# Patient Record
Sex: FEMALE | Race: WHITE | Employment: OTHER | URBAN - METROPOLITAN AREA
[De-identification: names, ages, dates, MRNs, and addresses within clinical notes are randomized per-mention and may not be internally consistent; named-entity substitution may affect disease eponyms.]

---

## 2017-03-23 ENCOUNTER — OFFICE VISIT (OUTPATIENT)
Dept: ORTHOPEDIC SURGERY | Age: 72
End: 2017-03-23

## 2017-03-23 DIAGNOSIS — M65.311 TRIGGER FINGER OF RIGHT THUMB: Primary | ICD-10-CM

## 2017-03-23 PROCEDURE — 3017F COLORECTAL CA SCREEN DOC REV: CPT | Performed by: ORTHOPAEDIC SURGERY

## 2017-03-23 PROCEDURE — G8420 CALC BMI NORM PARAMETERS: HCPCS | Performed by: ORTHOPAEDIC SURGERY

## 2017-03-23 PROCEDURE — G8484 FLU IMMUNIZE NO ADMIN: HCPCS | Performed by: ORTHOPAEDIC SURGERY

## 2017-03-23 PROCEDURE — 99213 OFFICE O/P EST LOW 20 MIN: CPT | Performed by: ORTHOPAEDIC SURGERY

## 2017-03-23 PROCEDURE — G8400 PT W/DXA NO RESULTS DOC: HCPCS | Performed by: ORTHOPAEDIC SURGERY

## 2017-03-23 PROCEDURE — 20550 NJX 1 TENDON SHEATH/LIGAMENT: CPT | Performed by: ORTHOPAEDIC SURGERY

## 2017-03-23 PROCEDURE — 1090F PRES/ABSN URINE INCON ASSESS: CPT | Performed by: ORTHOPAEDIC SURGERY

## 2017-03-23 PROCEDURE — 3014F SCREEN MAMMO DOC REV: CPT | Performed by: ORTHOPAEDIC SURGERY

## 2017-03-23 PROCEDURE — 1123F ACP DISCUSS/DSCN MKR DOCD: CPT | Performed by: ORTHOPAEDIC SURGERY

## 2017-03-23 PROCEDURE — 4040F PNEUMOC VAC/ADMIN/RCVD: CPT | Performed by: ORTHOPAEDIC SURGERY

## 2017-03-23 PROCEDURE — G8427 DOCREV CUR MEDS BY ELIG CLIN: HCPCS | Performed by: ORTHOPAEDIC SURGERY

## 2017-03-23 PROCEDURE — 1036F TOBACCO NON-USER: CPT | Performed by: ORTHOPAEDIC SURGERY

## 2017-03-27 ENCOUNTER — TELEPHONE (OUTPATIENT)
Dept: ORTHOPEDIC SURGERY | Age: 72
End: 2017-03-27

## 2017-04-24 ENCOUNTER — TELEPHONE (OUTPATIENT)
Dept: FAMILY MEDICINE CLINIC | Age: 72
End: 2017-04-24

## 2017-04-24 DIAGNOSIS — N30.00 ACUTE CYSTITIS WITHOUT HEMATURIA: Primary | ICD-10-CM

## 2017-04-24 RX ORDER — CEPHALEXIN 500 MG/1
500 CAPSULE ORAL 3 TIMES DAILY
Qty: 15 CAPSULE | Refills: 0 | Status: SHIPPED | OUTPATIENT
Start: 2017-04-24 | End: 2017-04-29

## 2017-04-24 RX ORDER — SULFAMETHOXAZOLE AND TRIMETHOPRIM 800; 160 MG/1; MG/1
1 TABLET ORAL 2 TIMES DAILY
Qty: 10 TABLET | Refills: 0 | Status: SHIPPED | OUTPATIENT
Start: 2017-04-24 | End: 2017-05-03 | Stop reason: SDUPTHER

## 2017-05-02 ENCOUNTER — OFFICE VISIT (OUTPATIENT)
Dept: FAMILY MEDICINE CLINIC | Age: 72
End: 2017-05-02

## 2017-05-02 VITALS
SYSTOLIC BLOOD PRESSURE: 106 MMHG | BODY MASS INDEX: 29.56 KG/M2 | HEART RATE: 64 BPM | DIASTOLIC BLOOD PRESSURE: 62 MMHG | RESPIRATION RATE: 16 BRPM | WEIGHT: 159 LBS | TEMPERATURE: 97.4 F | OXYGEN SATURATION: 97 %

## 2017-05-02 DIAGNOSIS — N39.0 RECURRENT UTI: ICD-10-CM

## 2017-05-02 DIAGNOSIS — R30.0 DYSURIA: Primary | ICD-10-CM

## 2017-05-02 LAB
BILIRUBIN, POC: NEGATIVE
BLOOD URINE, POC: NEGATIVE
CLARITY, POC: CLEAR
COLOR, POC: YELLOW
GLUCOSE URINE, POC: NEGATIVE
KETONES, POC: NEGATIVE
LEUKOCYTE EST, POC: NEGATIVE
NITRITE, POC: NEGATIVE
PH, POC: 6
PROTEIN, POC: NEGATIVE
SPECIFIC GRAVITY, POC: 1.01
UROBILINOGEN, POC: NEGATIVE

## 2017-05-02 PROCEDURE — G8427 DOCREV CUR MEDS BY ELIG CLIN: HCPCS | Performed by: FAMILY MEDICINE

## 2017-05-02 PROCEDURE — 3017F COLORECTAL CA SCREEN DOC REV: CPT | Performed by: FAMILY MEDICINE

## 2017-05-02 PROCEDURE — G8420 CALC BMI NORM PARAMETERS: HCPCS | Performed by: FAMILY MEDICINE

## 2017-05-02 PROCEDURE — 81002 URINALYSIS NONAUTO W/O SCOPE: CPT | Performed by: FAMILY MEDICINE

## 2017-05-02 PROCEDURE — 1123F ACP DISCUSS/DSCN MKR DOCD: CPT | Performed by: FAMILY MEDICINE

## 2017-05-02 PROCEDURE — 1036F TOBACCO NON-USER: CPT | Performed by: FAMILY MEDICINE

## 2017-05-02 PROCEDURE — 4040F PNEUMOC VAC/ADMIN/RCVD: CPT | Performed by: FAMILY MEDICINE

## 2017-05-02 PROCEDURE — 1090F PRES/ABSN URINE INCON ASSESS: CPT | Performed by: FAMILY MEDICINE

## 2017-05-02 PROCEDURE — G8400 PT W/DXA NO RESULTS DOC: HCPCS | Performed by: FAMILY MEDICINE

## 2017-05-02 PROCEDURE — 99213 OFFICE O/P EST LOW 20 MIN: CPT | Performed by: FAMILY MEDICINE

## 2017-05-02 PROCEDURE — 3014F SCREEN MAMMO DOC REV: CPT | Performed by: FAMILY MEDICINE

## 2017-05-02 RX ORDER — ESTRADIOL 0.1 MG/G
CREAM VAGINAL
Qty: 1 TUBE | Refills: 1 | Status: SHIPPED | OUTPATIENT
Start: 2017-05-02 | End: 2018-05-22

## 2017-05-03 RX ORDER — SULFAMETHOXAZOLE AND TRIMETHOPRIM 800; 160 MG/1; MG/1
1 TABLET ORAL 2 TIMES DAILY
Qty: 10 TABLET | Refills: 0 | Status: SHIPPED | OUTPATIENT
Start: 2017-05-03 | End: 2017-05-08

## 2017-06-08 ENCOUNTER — OFFICE VISIT (OUTPATIENT)
Dept: ORTHOPEDIC SURGERY | Age: 72
End: 2017-06-08

## 2017-06-08 VITALS
BODY MASS INDEX: 27.46 KG/M2 | HEIGHT: 63 IN | SYSTOLIC BLOOD PRESSURE: 114 MMHG | WEIGHT: 155 LBS | DIASTOLIC BLOOD PRESSURE: 76 MMHG

## 2017-06-08 DIAGNOSIS — M25.522 ELBOW PAIN, LEFT: Primary | ICD-10-CM

## 2017-06-08 PROCEDURE — G8420 CALC BMI NORM PARAMETERS: HCPCS | Performed by: ORTHOPAEDIC SURGERY

## 2017-06-08 PROCEDURE — 73080 X-RAY EXAM OF ELBOW: CPT | Performed by: ORTHOPAEDIC SURGERY

## 2017-06-08 PROCEDURE — G8400 PT W/DXA NO RESULTS DOC: HCPCS | Performed by: ORTHOPAEDIC SURGERY

## 2017-06-08 PROCEDURE — 3014F SCREEN MAMMO DOC REV: CPT | Performed by: ORTHOPAEDIC SURGERY

## 2017-06-08 PROCEDURE — G8427 DOCREV CUR MEDS BY ELIG CLIN: HCPCS | Performed by: ORTHOPAEDIC SURGERY

## 2017-06-08 PROCEDURE — 99213 OFFICE O/P EST LOW 20 MIN: CPT | Performed by: ORTHOPAEDIC SURGERY

## 2017-06-08 PROCEDURE — 1090F PRES/ABSN URINE INCON ASSESS: CPT | Performed by: ORTHOPAEDIC SURGERY

## 2017-06-08 PROCEDURE — 1036F TOBACCO NON-USER: CPT | Performed by: ORTHOPAEDIC SURGERY

## 2017-06-08 PROCEDURE — 4040F PNEUMOC VAC/ADMIN/RCVD: CPT | Performed by: ORTHOPAEDIC SURGERY

## 2017-06-08 PROCEDURE — 1123F ACP DISCUSS/DSCN MKR DOCD: CPT | Performed by: ORTHOPAEDIC SURGERY

## 2017-06-08 PROCEDURE — 3017F COLORECTAL CA SCREEN DOC REV: CPT | Performed by: ORTHOPAEDIC SURGERY

## 2017-06-29 ENCOUNTER — OFFICE VISIT (OUTPATIENT)
Dept: ORTHOPEDIC SURGERY | Age: 72
End: 2017-06-29

## 2017-06-29 DIAGNOSIS — M79.602 PAIN OF LEFT UPPER EXTREMITY: Primary | ICD-10-CM

## 2017-06-29 PROCEDURE — 95886 MUSC TEST DONE W/N TEST COMP: CPT | Performed by: PHYSICAL MEDICINE & REHABILITATION

## 2017-06-29 PROCEDURE — 1036F TOBACCO NON-USER: CPT | Performed by: PHYSICAL MEDICINE & REHABILITATION

## 2017-06-29 PROCEDURE — 95909 NRV CNDJ TST 5-6 STUDIES: CPT | Performed by: PHYSICAL MEDICINE & REHABILITATION

## 2017-07-06 ENCOUNTER — OFFICE VISIT (OUTPATIENT)
Dept: ORTHOPEDIC SURGERY | Age: 72
End: 2017-07-06

## 2017-07-06 VITALS — HEIGHT: 63 IN | BODY MASS INDEX: 27.46 KG/M2 | WEIGHT: 154.98 LBS

## 2017-07-06 DIAGNOSIS — G56.22 CUBITAL TUNNEL SYNDROME ON LEFT: Primary | ICD-10-CM

## 2017-07-06 PROCEDURE — 3017F COLORECTAL CA SCREEN DOC REV: CPT | Performed by: ORTHOPAEDIC SURGERY

## 2017-07-06 PROCEDURE — 1036F TOBACCO NON-USER: CPT | Performed by: ORTHOPAEDIC SURGERY

## 2017-07-06 PROCEDURE — 3014F SCREEN MAMMO DOC REV: CPT | Performed by: ORTHOPAEDIC SURGERY

## 2017-07-06 PROCEDURE — G8400 PT W/DXA NO RESULTS DOC: HCPCS | Performed by: ORTHOPAEDIC SURGERY

## 2017-07-06 PROCEDURE — 1090F PRES/ABSN URINE INCON ASSESS: CPT | Performed by: ORTHOPAEDIC SURGERY

## 2017-07-06 PROCEDURE — G8427 DOCREV CUR MEDS BY ELIG CLIN: HCPCS | Performed by: ORTHOPAEDIC SURGERY

## 2017-07-06 PROCEDURE — 4040F PNEUMOC VAC/ADMIN/RCVD: CPT | Performed by: ORTHOPAEDIC SURGERY

## 2017-07-06 PROCEDURE — 1123F ACP DISCUSS/DSCN MKR DOCD: CPT | Performed by: ORTHOPAEDIC SURGERY

## 2017-07-06 PROCEDURE — 99213 OFFICE O/P EST LOW 20 MIN: CPT | Performed by: ORTHOPAEDIC SURGERY

## 2017-07-06 PROCEDURE — G8419 CALC BMI OUT NRM PARAM NOF/U: HCPCS | Performed by: ORTHOPAEDIC SURGERY

## 2017-10-27 ENCOUNTER — OFFICE VISIT (OUTPATIENT)
Dept: FAMILY MEDICINE CLINIC | Age: 72
End: 2017-10-27

## 2017-10-27 VITALS
SYSTOLIC BLOOD PRESSURE: 131 MMHG | TEMPERATURE: 98.4 F | HEART RATE: 73 BPM | DIASTOLIC BLOOD PRESSURE: 79 MMHG | WEIGHT: 162 LBS | BODY MASS INDEX: 29.07 KG/M2 | OXYGEN SATURATION: 94 %

## 2017-10-27 DIAGNOSIS — R21 RASH: Primary | ICD-10-CM

## 2017-10-27 PROCEDURE — 4040F PNEUMOC VAC/ADMIN/RCVD: CPT | Performed by: FAMILY MEDICINE

## 2017-10-27 PROCEDURE — 1090F PRES/ABSN URINE INCON ASSESS: CPT | Performed by: FAMILY MEDICINE

## 2017-10-27 PROCEDURE — G8417 CALC BMI ABV UP PARAM F/U: HCPCS | Performed by: FAMILY MEDICINE

## 2017-10-27 PROCEDURE — 1123F ACP DISCUSS/DSCN MKR DOCD: CPT | Performed by: FAMILY MEDICINE

## 2017-10-27 PROCEDURE — 1036F TOBACCO NON-USER: CPT | Performed by: FAMILY MEDICINE

## 2017-10-27 PROCEDURE — 3017F COLORECTAL CA SCREEN DOC REV: CPT | Performed by: FAMILY MEDICINE

## 2017-10-27 PROCEDURE — G8427 DOCREV CUR MEDS BY ELIG CLIN: HCPCS | Performed by: FAMILY MEDICINE

## 2017-10-27 PROCEDURE — G8484 FLU IMMUNIZE NO ADMIN: HCPCS | Performed by: FAMILY MEDICINE

## 2017-10-27 PROCEDURE — 3014F SCREEN MAMMO DOC REV: CPT | Performed by: FAMILY MEDICINE

## 2017-10-27 PROCEDURE — 99213 OFFICE O/P EST LOW 20 MIN: CPT | Performed by: FAMILY MEDICINE

## 2017-10-27 PROCEDURE — G8400 PT W/DXA NO RESULTS DOC: HCPCS | Performed by: FAMILY MEDICINE

## 2017-10-27 RX ORDER — MOMETASONE FUROATE 1 MG/G
CREAM TOPICAL
Qty: 50 G | Refills: 0 | Status: SHIPPED | OUTPATIENT
Start: 2017-10-27 | End: 2018-05-22

## 2017-10-27 NOTE — PROGRESS NOTES
Patient is here for anterior neck rash. Itching ,but Cortisone is helping. She is taking Benadryl at night . Sleeping okay. No rash elsewhere. She has had occasional flares in the past.  No rash to face. She has used a new product recently - moisturizer. She used it on her face though and no symptoms. Not working outside recently. ROS: All other systems were reviewed and are negative . Patient's allergies and medications were reviewed. Patient's past medical, surgical, social , and family history were reviewed. OBJECTIVE:  /79 (Site: Right Arm, Position: Sitting, Cuff Size: Medium Adult)   Pulse 73   Temp 98.4 °F (36.9 °C) (Oral)   Wt 162 lb (73.5 kg)   LMP  (Exact Date)   SpO2 94%   BMI 29.07 kg/m²   General: NAD, cooperative, alert and oriented X 3. Mood / affect is good. good insight. well hydrated. Neck : no lymphadenopathy, supple, FROM  CV: Regular rate and rhythm , no murmurs/ rub/ gallop. No edema. Lungs : CTA bilaterally, breathing comfortably  Abdomen: positive bowel sounds, soft , non tender, non distended. No hepatosplenomegaly. No CVA tenderness. Skin: erythematous , maculopapular rash to anterior neck. Non tender. ASSESSMENT/  PLAN:  Jorge Luis Glez was seen today for rash. Diagnoses and all orders for this visit:    Rash  -     Mometasone (ELOCON) 0.1 % cream; Apply topically bid prn rash to neck , extremities, trunk. Avoid face. -     Likely allergic component/  Contact dermatitis. -     Avoid new products, including recent new moisturizer. F/u if no improvement 7-10d/ prn increased symptoms.

## 2017-11-30 ENCOUNTER — OFFICE VISIT (OUTPATIENT)
Dept: FAMILY MEDICINE CLINIC | Age: 72
End: 2017-11-30

## 2017-11-30 VITALS — HEART RATE: 68 BPM | SYSTOLIC BLOOD PRESSURE: 114 MMHG | DIASTOLIC BLOOD PRESSURE: 68 MMHG | OXYGEN SATURATION: 99 %

## 2017-11-30 DIAGNOSIS — R30.0 BURNING WITH URINATION: Primary | ICD-10-CM

## 2017-11-30 DIAGNOSIS — N30.00 ACUTE CYSTITIS WITHOUT HEMATURIA: ICD-10-CM

## 2017-11-30 LAB
BILIRUBIN, POC: NEGATIVE
BLOOD URINE, POC: ABNORMAL
CLARITY, POC: CLEAR
COLOR, POC: YELLOW
GLUCOSE URINE, POC: NEGATIVE
KETONES, POC: NEGATIVE
LEUKOCYTE EST, POC: ABNORMAL
NITRITE, POC: NEGATIVE
PH, POC: 6
PROTEIN, POC: NEGATIVE
SPECIFIC GRAVITY, POC: 1.01
UROBILINOGEN, POC: 0.2

## 2017-11-30 PROCEDURE — G8400 PT W/DXA NO RESULTS DOC: HCPCS | Performed by: FAMILY MEDICINE

## 2017-11-30 PROCEDURE — 3014F SCREEN MAMMO DOC REV: CPT | Performed by: FAMILY MEDICINE

## 2017-11-30 PROCEDURE — G8427 DOCREV CUR MEDS BY ELIG CLIN: HCPCS | Performed by: FAMILY MEDICINE

## 2017-11-30 PROCEDURE — 81002 URINALYSIS NONAUTO W/O SCOPE: CPT | Performed by: FAMILY MEDICINE

## 2017-11-30 PROCEDURE — 1036F TOBACCO NON-USER: CPT | Performed by: FAMILY MEDICINE

## 2017-11-30 PROCEDURE — 99213 OFFICE O/P EST LOW 20 MIN: CPT | Performed by: FAMILY MEDICINE

## 2017-11-30 PROCEDURE — 1090F PRES/ABSN URINE INCON ASSESS: CPT | Performed by: FAMILY MEDICINE

## 2017-11-30 PROCEDURE — 3017F COLORECTAL CA SCREEN DOC REV: CPT | Performed by: FAMILY MEDICINE

## 2017-11-30 PROCEDURE — G8484 FLU IMMUNIZE NO ADMIN: HCPCS | Performed by: FAMILY MEDICINE

## 2017-11-30 PROCEDURE — 1123F ACP DISCUSS/DSCN MKR DOCD: CPT | Performed by: FAMILY MEDICINE

## 2017-11-30 PROCEDURE — 4040F PNEUMOC VAC/ADMIN/RCVD: CPT | Performed by: FAMILY MEDICINE

## 2017-11-30 PROCEDURE — G8417 CALC BMI ABV UP PARAM F/U: HCPCS | Performed by: FAMILY MEDICINE

## 2017-11-30 RX ORDER — SULFAMETHOXAZOLE AND TRIMETHOPRIM 800; 160 MG/1; MG/1
1 TABLET ORAL 2 TIMES DAILY
Qty: 10 TABLET | Refills: 0 | Status: SHIPPED | OUTPATIENT
Start: 2017-11-30 | End: 2018-05-22 | Stop reason: SDUPTHER

## 2017-11-30 NOTE — PROGRESS NOTES
Subjective:      Patient ID: Saúl Bryson is a 71 y.o. female. HPI  Addison Le is here for evaluation for a UTI    She developed urinary frequency, dysuria, urgency. No fever, back pain, hematuria. She did an over-the-counter test strip - tested + for UTI so she started Bactrim yesterday afternoon. Feels better today. Rx: Bactrim    Outpatient Prescriptions Marked as Taking for the 11/30/17 encounter (Office Visit) with Akhil Morse MD   Medication Sig Dispense Refill    butalbital-aspirin-caffeine [facility]) -40 MG per capsule Take 1 capsule by mouth as needed for Headaches 30 capsule 0    aspirin 81 MG tablet Take 81 mg by mouth 2 times daily       Cranberry-Vitamin C-Probiotic (AZO CRANBERRY PO) Take  by mouth.  Cranberry 405 MG CAPS Take 1 capsule by mouth 2 times daily.  calcium carbonate (OSCAL) 500 MG TABS tablet Take 500 mg by mouth daily.  b complex vitamins capsule Take 1 capsule by mouth daily. PMH, PSH, SH, Problem list reviewed. Social History   Substance Use Topics    Smoking status: Never Smoker    Smokeless tobacco: Never Used    Alcohol use No      Allergies   Allergen Reactions    Other      Butazolidin- pt states was given many yrs ago for varicose veins and pt had adverse reaction        Review of Systems    Objective:   Physical Exam  NAD  Skin warm and dry. Chest is clear, no wheezing or rales. Normal symmetric air entry throughout both lung fields. Heart regular with normal rate, no murmer or gallop The abdomen is soft with mild suprabubic tenderness; no  guarding, mass, rebound or organomegaly. Bowel sounds are normal. No CVA tenderness or inguinal adenopathy noted. Assessment:      1. Burning with urination  POCT Urinalysis no Micro   2. Acute cystitis without hematuria  sulfamethoxazole-trimethoprim (BACTRIM DS) 800-160 MG per tablet          Plan:      Side effects of current medications reviewed and questions answered.    Prevention of UTIs discussed: drink plenty of fluids, avoid ceci, avoid holding urine, double void, empty bladder before or after intercourse, eat yogurt on a daily basis. Call or return to clinic prn if these symptoms worsen or fail to improve as anticipated.

## 2018-05-22 ENCOUNTER — OFFICE VISIT (OUTPATIENT)
Dept: FAMILY MEDICINE CLINIC | Age: 73
End: 2018-05-22

## 2018-05-22 VITALS
SYSTOLIC BLOOD PRESSURE: 118 MMHG | HEART RATE: 74 BPM | TEMPERATURE: 97 F | OXYGEN SATURATION: 97 % | WEIGHT: 161.2 LBS | RESPIRATION RATE: 16 BRPM | DIASTOLIC BLOOD PRESSURE: 78 MMHG | BODY MASS INDEX: 28.92 KG/M2

## 2018-05-22 DIAGNOSIS — Z13.1 DIABETES MELLITUS SCREENING: ICD-10-CM

## 2018-05-22 DIAGNOSIS — Z13.29 THYROID DISORDER SCREEN: ICD-10-CM

## 2018-05-22 DIAGNOSIS — N30.00 ACUTE CYSTITIS WITHOUT HEMATURIA: Primary | ICD-10-CM

## 2018-05-22 DIAGNOSIS — Z11.59 ENCOUNTER FOR HEPATITIS C SCREENING TEST FOR LOW RISK PATIENT: ICD-10-CM

## 2018-05-22 DIAGNOSIS — Z23 NEED FOR TD VACCINE: ICD-10-CM

## 2018-05-22 DIAGNOSIS — Z13.220 LIPID SCREENING: ICD-10-CM

## 2018-05-22 DIAGNOSIS — Z23 NEED FOR SHINGLES VACCINE: ICD-10-CM

## 2018-05-22 LAB
BILIRUBIN, POC: NORMAL
BLOOD URINE, POC: NORMAL
CLARITY, POC: CLEAR
COLOR, POC: YELLOW
GLUCOSE URINE, POC: NORMAL
KETONES, POC: NORMAL
LEUKOCYTE EST, POC: NORMAL
NITRITE, POC: NORMAL
PH, POC: 6
PROTEIN, POC: NORMAL
SPECIFIC GRAVITY, POC: 1
UROBILINOGEN, POC: 0.2

## 2018-05-22 PROCEDURE — 1090F PRES/ABSN URINE INCON ASSESS: CPT | Performed by: FAMILY MEDICINE

## 2018-05-22 PROCEDURE — 1036F TOBACCO NON-USER: CPT | Performed by: FAMILY MEDICINE

## 2018-05-22 PROCEDURE — 3017F COLORECTAL CA SCREEN DOC REV: CPT | Performed by: FAMILY MEDICINE

## 2018-05-22 PROCEDURE — 1123F ACP DISCUSS/DSCN MKR DOCD: CPT | Performed by: FAMILY MEDICINE

## 2018-05-22 PROCEDURE — G8427 DOCREV CUR MEDS BY ELIG CLIN: HCPCS | Performed by: FAMILY MEDICINE

## 2018-05-22 PROCEDURE — 4040F PNEUMOC VAC/ADMIN/RCVD: CPT | Performed by: FAMILY MEDICINE

## 2018-05-22 PROCEDURE — G8417 CALC BMI ABV UP PARAM F/U: HCPCS | Performed by: FAMILY MEDICINE

## 2018-05-22 PROCEDURE — 99214 OFFICE O/P EST MOD 30 MIN: CPT | Performed by: FAMILY MEDICINE

## 2018-05-22 PROCEDURE — 81002 URINALYSIS NONAUTO W/O SCOPE: CPT | Performed by: FAMILY MEDICINE

## 2018-05-22 PROCEDURE — G8400 PT W/DXA NO RESULTS DOC: HCPCS | Performed by: FAMILY MEDICINE

## 2018-05-22 RX ORDER — SULFAMETHOXAZOLE AND TRIMETHOPRIM 800; 160 MG/1; MG/1
1 TABLET ORAL 2 TIMES DAILY
Qty: 10 TABLET | Refills: 0 | Status: SHIPPED | OUTPATIENT
Start: 2018-05-22 | End: 2018-05-27

## 2018-05-22 RX ORDER — TETANUS AND DIPHTHERIA TOXOIDS ADSORBED 2; 2 [LF]/.5ML; [LF]/.5ML
0.5 INJECTION INTRAMUSCULAR ONCE
Qty: 0.5 ML | Refills: 0 | Status: SHIPPED | OUTPATIENT
Start: 2018-05-22 | End: 2018-05-22

## 2018-05-24 LAB — URINE CULTURE, ROUTINE: NORMAL

## 2019-05-03 ENCOUNTER — TELEPHONE (OUTPATIENT)
Dept: FAMILY MEDICINE CLINIC | Age: 74
End: 2019-05-03

## 2019-05-03 DIAGNOSIS — Z11.59 ENCOUNTER FOR HEPATITIS C SCREENING TEST FOR LOW RISK PATIENT: ICD-10-CM

## 2019-05-03 DIAGNOSIS — Z13.1 DIABETES MELLITUS SCREENING: ICD-10-CM

## 2019-05-03 DIAGNOSIS — M79.7 FIBROMYALGIA: ICD-10-CM

## 2019-05-03 DIAGNOSIS — Z13.29 THYROID DISORDER SCREEN: ICD-10-CM

## 2019-05-03 DIAGNOSIS — Z13.220 LIPID SCREENING: Primary | ICD-10-CM

## 2019-05-03 NOTE — TELEPHONE ENCOUNTER
Patient requesting labs done before physical on 5/15/2019.  She wants to have the labs done a [facility]

## 2019-05-15 ENCOUNTER — OFFICE VISIT (OUTPATIENT)
Dept: FAMILY MEDICINE CLINIC | Age: 74
End: 2019-05-15
Payer: COMMERCIAL

## 2019-05-15 VITALS
HEIGHT: 62 IN | OXYGEN SATURATION: 95 % | RESPIRATION RATE: 14 BRPM | SYSTOLIC BLOOD PRESSURE: 110 MMHG | TEMPERATURE: 96.1 F | WEIGHT: 162 LBS | BODY MASS INDEX: 29.81 KG/M2 | HEART RATE: 73 BPM | DIASTOLIC BLOOD PRESSURE: 65 MMHG

## 2019-05-15 DIAGNOSIS — Z00.00 ROUTINE GENERAL MEDICAL EXAMINATION AT A HEALTH CARE FACILITY: ICD-10-CM

## 2019-05-15 DIAGNOSIS — Z23 NEED FOR SHINGLES VACCINE: ICD-10-CM

## 2019-05-15 DIAGNOSIS — Z12.31 ENCOUNTER FOR SCREENING MAMMOGRAM FOR BREAST CANCER: ICD-10-CM

## 2019-05-15 DIAGNOSIS — Z23 NEED FOR TD VACCINE: Primary | ICD-10-CM

## 2019-05-15 DIAGNOSIS — M81.0 AGE-RELATED OSTEOPOROSIS WITHOUT CURRENT PATHOLOGICAL FRACTURE: ICD-10-CM

## 2019-05-15 PROCEDURE — G0438 PPPS, INITIAL VISIT: HCPCS | Performed by: FAMILY MEDICINE

## 2019-05-15 PROCEDURE — 3017F COLORECTAL CA SCREEN DOC REV: CPT | Performed by: FAMILY MEDICINE

## 2019-05-15 PROCEDURE — 4040F PNEUMOC VAC/ADMIN/RCVD: CPT | Performed by: FAMILY MEDICINE

## 2019-05-15 PROCEDURE — 1123F ACP DISCUSS/DSCN MKR DOCD: CPT | Performed by: FAMILY MEDICINE

## 2019-05-15 RX ORDER — OMEGA-3S/DHA/EPA/FISH OIL/D3 300MG-1000
400 CAPSULE ORAL DAILY
COMMUNITY

## 2019-05-15 RX ORDER — TETANUS AND DIPHTHERIA TOXOIDS ADSORBED 2; 2 [LF]/.5ML; [LF]/.5ML
0.5 INJECTION INTRAMUSCULAR ONCE
Qty: 0.5 ML | Refills: 0 | Status: SHIPPED | OUTPATIENT
Start: 2019-05-15 | End: 2019-05-15

## 2019-05-15 RX ORDER — IBUPROFEN 200 MG
200 TABLET ORAL EVERY 6 HOURS PRN
COMMUNITY

## 2019-05-15 ASSESSMENT — ANXIETY QUESTIONNAIRES: GAD7 TOTAL SCORE: 0

## 2019-05-15 ASSESSMENT — LIFESTYLE VARIABLES
HOW OFTEN DO YOU HAVE A DRINK CONTAINING ALCOHOL: 0
HOW OFTEN DO YOU HAVE A DRINK CONTAINING ALCOHOL: 0

## 2019-05-15 ASSESSMENT — PATIENT HEALTH QUESTIONNAIRE - PHQ9
SUM OF ALL RESPONSES TO PHQ QUESTIONS 1-9: 0
SUM OF ALL RESPONSES TO PHQ QUESTIONS 1-9: 0

## 2019-05-15 NOTE — PATIENT INSTRUCTIONS
Patient Education      Patient Education        Resistance Training With Surgical Tubing: Exercises  Your Care Instructions  Here are some examples of exercises for resistance training. Start each exercise slowly. Ease off the exercise if you start to have pain. Your doctor or physical therapist will tell you when you can start these exercises and which ones will work best for you. How to do the exercises  Side pull    1. Raise both arms overhead, palms of your hands facing forward. 2. Pull one arm down and to the side, bending your elbow as shown, and hold. 3. Slowly reach up again. Repeat with the other arm. 4. Repeat 8 to 12 times with each hand. 5. Rest for a minute, and repeat the exercise. Overhead pull    1. Raise both arms overhead, palms of your hands facing forward. 2. Tighten the tubing by slowly pulling both arms away from center, and hold. 3. Slowly return to the starting position with your arms straight up. 4. Repeat 8 to 12 times. 5. Rest for a minute, and repeat the exercise. Up-down pull    1. Raise both arms overhead. 2. Bend your elbows so that they are shoulder height, and hold the stretched tubing behind or in front of your head. 3. Slowly return to the starting position with your arms straight up. 4. Repeat 8 to 12 times. 5. Rest for a minute, and repeat the exercise. Chest-level pull    1. Raise your arms in front of you to chest level. Your elbows will be bent and held up at about shoulder height. 2. Pull your hands apart, stretching the tubing, and hold. Try to keep your hands up at your chest level, and do not pull your shoulders up toward your ears. 3. Slowly return to your starting position. 4. Repeat 8 to 12 times. 5. Rest for a minute, and repeat the exercise. Hip-level pull    1. Hold your hands at the level of your hips, or near your lap if you are sitting down. 2. Pull your hands apart, stretching the tubing, and hold.   3. Slowly return to your starting position. 4. Repeat 8 to 12 times. 5. Rest for a minute, and repeat the exercise. Follow-up care is a key part of your treatment and safety. Be sure to make and go to all appointments, and call your doctor if you are having problems. It's also a good idea to know your test results and keep a list of the medicines you take. Where can you learn more? Go to [email or web address] org and sign in to your [employer] account. Enter H660 in the [employer] box to learn more about \"Resistance Training With Surgical Tubing: Exercises. \"     If you do not have an account, please click on the \"Sign Up Now\" link. Current as of: August 19, 2018  Content Version: 12.0  © 2885-1471 Healthwise, Incorporated. Care instructions adapted under license by [facility] ([facility]). If you have questions about a medical condition or this instruction, always ask your healthcare professional. [place] [address] any warranty or liability for your use of this information. Resistance Training With Free Weights: Exercises  Your Care Instructions  Here are some examples of exercises for resistance training. Start each exercise slowly. Ease off the exercise if you start to have pain. Your doctor or physical therapist will tell you when you can start these exercises and which ones will work best for you. How to do the exercises  Chest fly    1. Lie on a bench or exercise ball, and hold the weights straight up over your chest. Do not lock your elbows. You can keep them slightly bent if that is comfortable for you. 2. Slowly lower your arms, keeping them extended, until the weights are level with your chest, or slightly lower. 3. Slowly raise your arms until you are in the starting position. 4. Repeat 8 to 12 times. 5. Rest for a minute, and repeat the exercise. Lateral raise for the outer part of the shoulder (deltoid)    1.  Stand with your feet shoulder-width apart and your knees slightly bent.  2. Bend your arms 90 degrees with your elbows at hip level. With your palms facing in, hold the weights straight in front of you. 3. Slowly lift the weights and your elbows out to the sides to shoulder level, keeping your elbows bent. Keep your shoulders down and relaxed as you lift. If you find you are shrugging your shoulders up toward your ears, your weights may be too heavy. 4. Slowly lower the weights back to your sides. 5. Repeat 8 to 12 times. 6. Rest for a minute, and repeat the exercise. Biceps curls    1. Sit leaning forward with your legs slightly spread and your left hand on your left thigh. 2. Hold the weight in your right hand, and place your right elbow on your right thigh. 3. Slowly curl the weight up and toward your chest.  4. Slowly lower the weight to the original position. 5. Repeat 8 to 12 times. 6. Rest for a minute, and repeat the exercise. 7. Do the same exercise with your other arm. Follow-up care is a key part of your treatment and safety. Be sure to make and go to all appointments, and call your doctor if you are having problems. It's also a good idea to know your test results and keep a list of the medicines you take. Where can you learn more? Go to https://[email or web address].[email or web address] org and sign in to your [employer] account. Enter C166 in the [facility] box to learn more about \"Resistance Training With Free Weights: Exercises. \"     If you do not have an account, please click on the \"Sign Up Now\" link. Current as of: August 19, 2018  Content Version: 12.0  © 1912-4537 Healthwise, Incorporated. Care instructions adapted under license by [facility] ([facility]). If you have questions about a medical condition or this instruction, always ask your healthcare professional. [address] any warranty or liability for your use of this information.          Personalized Preventive Plan for Genette Carp - 5/15/2019  Medicare offers a range of preventive health benefits. Some of the tests and screenings are paid in full while other may be subject to a deductible, co-insurance, and/or copay. Some of these benefits include a comprehensive review of your medical history including lifestyle, illnesses that may run in your family, and various assessments and screenings as appropriate. After reviewing your medical record and screening and assessments performed today your provider may have ordered immunizations, labs, imaging, and/or referrals for you. A list of these orders (if applicable) as well as your Preventive Care list are included within your After Visit Summary for your review. Other Preventive Recommendations:    · A preventive eye exam performed by an eye specialist is recommended every 1-2 years to screen for glaucoma; cataracts, macular degeneration, and other eye disorders. · A preventive dental visit is recommended every 6 months. · Try to get at least 150 minutes of exercise per week or 10,000 steps per day on a pedometer . · Order or download the FREE \"Exercise & Physical Activity: Your Everyday Guide\" from [employer]. Call 4-656.285.9590 or search [employer] online. · You need 6860-4910 mg of calcium and 7997-2820 IU of vitamin D per day. It is possible to meet your calcium requirement with diet alone, but a vitamin D supplement is usually necessary to meet this goal.  · When exposed to the sun, use a sunscreen that protects against both UVA and UVB radiation with an SPF of 30 or greater. Reapply every 2 to 3 hours or after sweating, drying off with a towel, or swimming. · Always wear a seat belt when traveling in a car. Always wear a helmet when riding a bicycle or motorcycle.

## 2019-05-15 NOTE — PROGRESS NOTES
Medicare Annual Wellness Visit  Name: Leonel Yeung Date: 5/15/2019   MRN: [id number] Sex: Female   Age: 69 y.o. Ethnicity: Non-/Non    : 1945 Race: Leesa Alfaro is here for Medicare AWV (pap,spot on nose, indigestion concerns)    Screenings for behavioral, psychosocial and functional/safety risks, and cognitive dysfunction are all negative except as indicated below. These results, as well as other patient data from the [address] form, are documented in Flowsheets linked to this Encounter. Complains or epigastric pain and diarrhea after eating years. No RUQ pain. Allergies   Allergen Reactions    Other      Butazolidin- pt states was given many yrs ago for varicose veins and pt had adverse reaction     Prior to Visit Medications    Medication Sig Taking? Authorizing Provider   Glucosamine-Chondroit-Vit C-Mn (GLUCOSAMINE CHONDR 500 COMPLEX PO) Take by mouth Yes Historical Provider, MD   vitamin D3 (CHOLECALCIFEROL) 400 units TABS tablet Take 400 Units by mouth daily Yes Historical Provider, MD   ibuprofen (ADVIL;MOTRIN) 200 MG tablet Take 200 mg by mouth every 6 hours as needed for Pain Yes Historical Provider, MD   butalbital-aspirin-caffeine [facility] As[facility]) -40 MG per capsule Take 1 capsule by mouth as needed for Headaches Yes Cady Duong,    aspirin 81 MG tablet Take 81 mg by mouth 2 times daily  Yes Historical Provider, MD   Cranberry 405 MG CAPS Take 1 capsule by mouth 2 times daily. Yes Historical Provider, MD   calcium carbonate (OSCAL) 500 MG TABS tablet Take 500 mg by mouth daily. Yes Historical Provider, MD   b complex vitamins capsule Take 1 capsule by mouth daily.  Yes Historical Provider, MD     Past Medical History:   Diagnosis Date    Bladder problem     Bleeding disorder ([facility] [address]     Fibromyalgia     Migraine     Osteoporosis     Recurrent UTI     Skin lesion of left leg 3/15/2012    Vertigo 10/23/2012     Past Surgical History:   Procedure Laterality Date    APPENDECTOMY  1970    BLADDER SUSPENSION  2/12    Dr. Gavin Leung    Right lumpectomy    COLON SURGERY  1978    benign large growth    HYSTERECTOMY  2000    Partial    POLYPECTOMY      colon     Family History   Problem Relation Age of Onset    Cancer Mother         skin    Other Mother 80        DVT, PE    High Blood Pressure Father     Stroke Father     Heart Disease Father         MI    High Cholesterol Brother     High Blood Pressure Brother     Asthma Brother        CareTeam (Including outside providers/suppliers regularly involved in providing care):   Patient Care Team:  Albertina Smith MD as PCP - Andres Stearns MD as PCP - S Attributed Provider    Wt Readings from Last 3 Encounters:   05/15/19 162 lb (73.5 kg)   05/22/18 161 lb 3.2 oz (73.1 kg)   10/27/17 162 lb (73.5 kg)     Vitals:    05/15/19 0815   BP: 110/65   Pulse: 73   Resp: 14   Temp: 96.1 °F (35.6 °C)   TempSrc: Oral   SpO2: 95%   Weight: 162 lb (73.5 kg)   Height: 5' 1.81\" (1.57 m)     Body mass index is 29.81 kg/m². Based upon direct observation of the patient, evaluation of cognition reveals recent and remote memory intact.     General Appearance: alert and oriented to person, place and time, well developed and well- nourished, in no acute distress  Skin: warm and dry, no rash or erythema  Head: normocephalic and atraumatic  Eyes: pupils equal, round, and reactive to light, extraocular eye movements intact, conjunctivae normal  ENT: tympanic membrane, external ear and ear canal normal bilaterally, nose without deformity, nasal mucosa and turbinates normal without polyps  Neck: supple and non-tender without mass, no thyromegaly or thyroid nodules, no cervical lymphadenopathy  Pulmonary/Chest: clear to auscultation bilaterally- no wheezes, rales or rhonchi, normal air movement, no respiratory distress  Cardiovascular: normal rate, regular rhythm, normal S1 and S2, no murmurs, rubs, clicks, or gallops, distal pulses intact, no carotid bruits  Abdomen: soft, non-tender, non-distended, normal bowel sounds, no masses or organomegaly  Extremities: no cyanosis, clubbing or edema  Musculoskeletal: normal range of motion, no joint swelling, deformity or tenderness  Neurologic: reflexes normal and symmetric, no cranial nerve deficit, gait, coordination and speech normal    Patient's complete Health Risk Assessment and screening values have been reviewed and are found in Flowsheets. The following problems were reviewed today and where indicated follow up appointments were made and/or referrals ordered. Positive Risk Factor Screenings with Interventions:     General Health:  General  In general, how would you say your health is?: Excellent  In the past 7 days, have you experienced any of the following? New or Increased Pain, New or Increased Fatigue, Loneliness, Social Isolation, Stress or Anger?: None of These  Do you get the social and emotional support that you need?: Yes  Do you have a Living Will?: (!) No  General Health Risk Interventions:  · No Living Will: additional information provided LW and DPOA provided and discussed   · She does not want life support or tube feedings if terminal.     Health Habits/Nutrition:  Health Habits/Nutrition  Do you exercise for at least 20 minutes 2-3 times per week?: (!) No  Have you lost any weight without trying in the past 3 months?: No  Do you eat fewer than 2 meals per day?: No  Have you seen a dentist within the past year?: (!) No(on to do list)  Body mass index is 29.81 kg/m².   Health Habits/Nutrition Interventions:  · Inadequate physical activity:  exercise handout provided and discussed    Hearing/Vision:  Hearing/Vision  Do you or your family notice any trouble with your hearing?: No  Do you have difficulty driving, watching TV, or doing any of your daily activities because of your eyesight?: No  Have you had an eye exam within the past year?: (!) No(just over a year)  Hearing/Vision Interventions:  · Vision concerns:  patient encouraged to make appointment with his/her eye specialist    Safety:  Safety  Do you have working smoke detectors?: Yes  Have all throw rugs been removed or fastened?: Yes  Do you have non-slip mats in all bathtubs?: (!) No  Do all of your stairways have a railing or banister?: Yes  Are your doorways, halls and stairs free of clutter?: Yes  Do you always fasten your seatbelt when you are in a car?: Yes  Safety Interventions:  · Home safety tips provided      Declines mammogram; pros and cons addressed. Personalized Preventive Plan   Current Health Maintenance Status  Immunization History   Administered Date(s) Administered    Influenza Virus Vaccine 12/16/2011    Influenza Whole 11/26/2007    Influenza, High Dose (Fluzone 65 yrs and older) 12/07/2015    Pneumococcal 13-valent Conjugate (Buadtun88) 12/07/2015    Pneumococcal Polysaccharide (Ttauxstlt38) 12/16/2011        Health Maintenance   Topic Date Due    DTaP/Tdap/Td vaccine (1 - Tdap) 03/08/1965    Shingles Vaccine (1 of 2) 03/08/1996    Breast cancer screen  01/05/2018    Colon cancer screen colonoscopy  12/18/2018    Flu vaccine (Season Ended) 09/01/2019    Lipid screen  05/06/2024    Pneumococcal 65+ years Vaccine  Completed    Hepatitis C screen  Completed    DEXA (modify frequency per FRAX score)  Addressed     Recommendations for Preventive Services Due: see orders and patient instructions/AVS.  .   Recommended screening schedule for the next 5-10 years is provided to the patient in written form: see Patient Instructions/AVS.

## 2019-06-11 ENCOUNTER — HOSPITAL ENCOUNTER (OUTPATIENT)
Dept: GENERAL RADIOLOGY | Age: 74
Discharge: HOME | End: 2019-06-11
Payer: COMMERCIAL

## 2019-06-11 DIAGNOSIS — M81.0 AGE-RELATED OSTEOPOROSIS WITHOUT CURRENT PATHOLOGICAL FRACTURE: ICD-10-CM

## 2019-06-11 PROCEDURE — 77080 DXA BONE DENSITY AXIAL: CPT

## 2019-06-19 ENCOUNTER — TELEPHONE (OUTPATIENT)
Dept: FAMILY MEDICINE CLINIC | Age: 74
End: 2019-06-19

## 2019-06-21 ENCOUNTER — OFFICE VISIT (OUTPATIENT)
Dept: FAMILY MEDICINE CLINIC | Age: 74
End: 2019-06-21
Payer: MEDICARE

## 2019-06-21 VITALS
WEIGHT: 160 LBS | BODY MASS INDEX: 29.44 KG/M2 | HEART RATE: 72 BPM | DIASTOLIC BLOOD PRESSURE: 66 MMHG | SYSTOLIC BLOOD PRESSURE: 104 MMHG | TEMPERATURE: 97.4 F | OXYGEN SATURATION: 94 %

## 2019-06-21 DIAGNOSIS — R13.19 ESOPHAGEAL DYSPHAGIA: ICD-10-CM

## 2019-06-21 DIAGNOSIS — K21.9 GASTROESOPHAGEAL REFLUX DISEASE, ESOPHAGITIS PRESENCE NOT SPECIFIED: ICD-10-CM

## 2019-06-21 DIAGNOSIS — M81.0 AGE-RELATED OSTEOPOROSIS WITHOUT CURRENT PATHOLOGICAL FRACTURE: Primary | ICD-10-CM

## 2019-06-21 PROCEDURE — 99214 OFFICE O/P EST MOD 30 MIN: CPT | Performed by: FAMILY MEDICINE

## 2019-06-21 RX ORDER — RANITIDINE HCL 75 MG
75 TABLET ORAL DAILY PRN
COMMUNITY
End: 2019-06-21 | Stop reason: ALTCHOICE

## 2019-06-21 RX ORDER — OMEPRAZOLE 20 MG/1
20 CAPSULE, DELAYED RELEASE ORAL DAILY
Qty: 30 CAPSULE | Refills: 3 | Status: SHIPPED | OUTPATIENT
Start: 2019-06-21 | End: 2019-12-17 | Stop reason: SDUPTHER

## 2019-06-21 NOTE — PATIENT INSTRUCTIONS
Patient Education      Patient Education      Patient Education        Preventing Osteoporosis: Care Instructions  Your Care Instructions    Osteoporosis means the bones are weak and thin enough that they can break easily. The older you are, the more likely you are to get osteoporosis. But with plenty of calcium, vitamin D, and exercise, you can help prevent osteoporosis. The preteen and teen years are a key time for bone building. With the help of calcium, vitamin D, and exercise in those early years and beyond, the bones reach their peak density and strength by age 27. After age 27, your bones naturally start to thin and weaken. The stronger your bones are at around age 27, the lower your risk for osteoporosis. But no matter what your age and risk are, your bones still need calcium, vitamin D, and exercise to stay strong. Also avoid smoking, and limit alcohol. Smoking and heavy alcohol use can make your bones thinner. Talk to your doctor about any special risks you might have, such as having a close relative with osteoporosis or taking a medicine that can weaken bones. Your doctor can tell you the best ways to protect your bones from thinning. Follow-up care is a key part of your treatment and safety. Be sure to make and go to all appointments, and call your doctor if you are having problems. It's also a good idea to know your test results and keep a list of the medicines you take. How can you care for yourself at home? · Get enough calcium and vitamin D. The [place] [employer] recommends adults younger than age 46 need 1,000 mg of calcium and 600 IU of vitamin D each day. Women ages 46 to 79 need 1,200 mg of calcium and 600 IU of vitamin D each day. Men ages 46 to 79 need 1,000 mg of calcium and 600 IU of vitamin D each day. Adults 71 and older need 1,200 mg of calcium and 800 IU of vitamin D each day. ? Eat foods rich in calcium, like yogurt, cheese, milk, and dark green vegetables.   ? Eat foods rich in vitamin D, like eggs, fatty fish, cereal, and fortified milk. ? Get some sunshine. Your body uses sunshine to make its own vitamin D. The safest time to be out in the sun is before 10 a.m. or after 3 p.m. Avoid getting sunburned. Sunburn can increase your risk of skin cancer. ? Talk to your doctor about taking a calcium plus vitamin D supplement. Ask about what type of calcium is right for you, and how much to take at a time. Adults ages 23 to 48 should not get more than 2,500 mg of calcium and 4,000 IU of vitamin D each day, whether it is from supplements and/or food. Adults ages 46 and older should not get more than 2,000 mg of calcium and 4,000 IU of vitamin D each day from supplements and/or food. · Get regular bone-building exercise. Weight-bearing and resistance exercises keep bones healthy by working the muscles and bones against gravity. Start out at an exercise level that feels right for you. Add a little at a time until you can do the following:  ? Do 30 minutes of weight-bearing exercise on most days of the week. Walking, jogging, stair climbing, and dancing are good choices. ? Do resistance exercises with weights or elastic bands 2 to 3 days a week. · Limit alcohol. Drink no more than 1 alcohol drink a day if you are a woman. Drink no more than 2 alcohol drinks a day if you are a man. · Do not smoke. Smoking can make bones thin faster. If you need help quitting, talk to your doctor about stop-smoking programs and medicines. These can increase your chances of quitting for good. When should you call for help? Watch closely for changes in your health, and be sure to contact your doctor if you have any problems. Where can you learn more? Go to https://[employer]. org and sign in to your eBillme account. Enter U096 in the Memento box to learn more about \"Preventing Osteoporosis: Care Instructions. \"     If you do not have an account, please click on the \"Sign Up Adults 71 and older need 1,200 mg of calcium and 800 IU of vitamin D each day. ? Eat foods rich in calcium, like yogurt, cheese, milk, and dark green vegetables. This is a good way to get the calcium you need. You can get vitamin D from eggs, fatty fish, cereal, and milk. ? Talk to your doctor about taking a calcium plus vitamin D supplement. Be careful, though. Adults ages 23 to 48 should not get more than 2,500 mg of calcium and 4,000 IU of vitamin D each day, whether it is from supplements and/or food. Adults ages 46 and older should not get more than 2,000 mg of calcium and 4,000 IU of vitamin D each day from supplements and/or food. · Limit alcohol to 2 drinks a day for men and 1 drink a day for women. Too much alcohol can cause health problems. · Do not smoke. Smoking puts you at a much higher risk for osteoporosis. If you need help quitting, talk to your doctor about stop-smoking programs and medicines. These can increase your chances of quitting for good. · Get regular bone-building exercise. Weight-bearing and resistance exercises keep bones healthy by working the muscles and bones against gravity. Start out at an exercise level that feels right for you. Add a little at a time until you can do the following:  ? Do 30 minutes of weight-bearing exercise on most days of the week. Walking, jogging, stair climbing, and dancing are good choices. ? Do resistance exercises with weights or elastic bands 2 to 3 days a week. · Reduce your risk of falls:  ? Wear supportive shoes with low heels and nonslip soles. ? Use a cane or walker, if you need it. Use shower chairs and bath benches. Put in handrails on stairways, around your shower or tub area, and near the toilet. ? Keep stairs, porches, and walkways well lit. Use night-lights. ? Remove throw rugs and other objects that are in the way. ? Avoid icy, wet, or slippery surfaces. ? Keep a cordless phone and a flashlight with new batteries by your bed.   When This medicine is for use when you have a high risk of bone fracture due to osteoporosis. Reclast  is also used to treat Paget's disease of bone. Zometa is used to treat high blood levels of calcium caused by cancer (also called hypercalcemia of malignancy). Zometa  also treats multiple myeloma (a type of bone marrow cancer) or bone cancer that has spread from elsewhere in the body. You should not use Reclast and Zometa at the same time. Zoledronic acid may also be used for purposes not listed in this medication guide. What should I discuss with my healthcare provider before receiving zoledronic acid? You should not be treated with zoledronic acid if you are allergic to it. You also should not receive Reclast if you have:  · low levels of calcium in your blood (hypocalcemia); or  · severe kidney disease. You should not be treated with zoledronic acid if are currently using any other bisphosphonate (such as alendronate, etidronate, ibandronate, pamidronate, risedronate, or tiludronate). To make sure zoledronic acid is safe for you, tell your doctor if you have ever had:  · kidney disease;  · hypocalcemia;  · thyroid or parathyroid surgery;  · surgery to remove part of your intestine;  · asthma caused by taking aspirin;  · any condition that makes it hard for your body to absorb nutrients from food (malabsorption); or  · a dental problem (you may need a dental exam before you receive zoledronic acid). Zoledronic acid can cause serious kidney problems,  especially if you are dehydrated, if you take diuretic medicine, or if you already have kidney disease. In rare cases, this medicine may cause bone loss (osteonecrosis) in the jaw. Symptoms include jaw pain or numbness, red or swollen gums, loose teeth, or slow healing after dental work. The longer you use zoledronic acid, the more likely you are to develop this condition.   Osteonecrosis of the jaw may be more likely if you have cancer or received chemotherapy, radiation, or steroids. Other risk factors include blood clotting disorders, anemia (low red blood cells), and a pre-existing dental problem. Zoledronic acid may harm an unborn baby. Use effective birth control to prevent pregnancy while you are using this medicine. Tell your doctor if you become pregnant. You may also need to use birth control for several weeks after you last received zoledronic acid. This medicine can have long-lasting effects on your body. Zoledronic acid can pass into breast milk and may harm a nursing baby. You should not breast-feed while using this medicine. How is zoledronic acid given? Zoledronic acid is injected into a vein through an IV. A healthcare provider will give you this injection. Zoledronic acid is sometimes given as a single dose only one time. It may also be given once every 1 or 2 years. How often you receive zoledronic acid will depend on why you are using this medicine. Follow your doctor's instructions. Drink at least 2 glasses of water within a few hours before your injection to keep from getting dehydrated. You may need frequent medical tests to help your doctor determine how long to treat you with zoledronic acid. Your kidney function may also need to be checked. Pay special attention to your dental hygiene while using zoledronic acid. Brush and floss your teeth regularly. If you need to have any dental work (especially surgery), tell the dentist ahead of time that you are using zoledronic acid. Zoledronic acid is only part of a complete program of treatment that may also include diet changes and taking calcium and vitamin supplements. Follow your doctor's instructions very closely. Your doctor will determine how long to treat you with this medicine. Zoledronic acid is often given for only 3 to 5 years. What happens if I miss a dose? Call your doctor for instructions if you miss an appointment for your zoledronic acid injection.   What happens if I [employer] Software Version: 16.02. Revision date: 12/4/2017. Care instructions adapted under license by [facility] ([facility]). If you have questions about a medical condition or this instruction, always ask your healthcare professional. [address] any warranty or liability for your use of this information.

## 2019-06-21 NOTE — PROGRESS NOTES
 aspirin 81 MG tablet Take 81 mg by mouth 2 times daily       Cranberry 405 MG CAPS Take 1 capsule by mouth 2 times daily.  calcium carbonate (OSCAL) 500 MG TABS tablet Take 500 mg by mouth daily.  b complex vitamins capsule Take 1 capsule by mouth daily. Allergies   Allergen Reactions    Other      Butazolidin- pt states was given many yrs ago for varicose veins and pt had adverse reaction       Patient Active Problem List   Diagnosis    Fibromyalgia    Tinnitus    GERD (gastroesophageal reflux disease)    Migraine    Cubital tunnel syndrome    Eyelid dermatitis, eczematous    Osteoporosis    Recurrent UTI    Osteoarthritis, knee    Osteoarthritis of thumb    Trigger finger of right thumb    Ulnar neuropathy of left upper extremity       Past Medical History:   Diagnosis Date    Bladder problem     Bleeding disorder ([address]     Fibromyalgia     Migraine     Osteoporosis     Recurrent UTI     Skin lesion of left leg 3/15/2012    Vertigo 10/23/2012       Past Surgical History:   Procedure Laterality Date    APPENDECTOMY  1970    BLADDER SUSPENSION  2/12    Dr. Lesa Sullivan    Right lumpectomy    COLON SURGERY  1978    benign large growth    HYSTERECTOMY  2000    Partial    POLYPECTOMY      colon        Family History   Problem Relation Age of Onset   [facility] Cancer Mother         skin    Other Mother 80        DVT, PE    High Blood Pressure Father     Stroke Father     Heart Disease Father         MI    High Cholesterol Brother     High Blood Pressure Brother     Asthma Brother        Social History     Tobacco Use    Smoking status: Never Smoker    Smokeless tobacco: Never Used   Substance Use Topics    Alcohol use: No    Drug use: No            Review of Systems  Review of Systems   Dexa Scan 6/11/19   L Spine (L 1-4): BMD= 0.726 g/cm2, T-score= -2.9, Z-score= -0.6.     There is interval decrease in the bone mineral density by 2.5%.       L Hip: BMD= 0.873 g/cm2, T-score= -0.6, Z-score= 1.1.     L Femoral Neck: BMD= 0.708 g/cm2, T-score= -1.3, Z-score= 0.7 interval significant decrease in the bone mineral density by 3.3%.       R Hip: BMD= 0.709 g/cm2, T-score= -1.9, Z-score= -0.2   R Femoral Neck: BMD= 0.612 g/cm2, T-score= -2.1, Z-score= -0.2        FRAX REPORT (WHO 10 Year Fracture Risk Assessment):         Not reported due to osteoporosis.         Impression       Bone mineral density is osteoporotic.               Objective:   Physical Exam  Vitals:    06/21/19 0810   BP: 104/66   Pulse: 72   Temp: 97.4 °F (36.3 °C)   TempSrc: Oral   SpO2: 94%   Weight: 160 lb (72.6 kg)       Physical Exam  NAD    Skin is warm and dry. No rash. Well hydrated  Alert and oriented x 3. Mood and affect are normal.  The neck is supple and free of adenopathy or masses, the thyroid is normal without enlargement or nodules. Chest: clear with no wheezes or rales. No retractions, or use of accessory muscles noted. Cardiovascular: PMI is not displaced, and no thrill noted. Regular rate and rhythm with no rub, murmur or gallop. No peripheral edema. The abdomen is soft without tenderness, guarding, mass, rebound or organomegaly. Assessment:       Diagnosis Orders   1. Age-related osteoporosis without current pathological fracture  Do not take Fosamax due to GERD. Options addressed and written info provided. She wishes to consider options   2. Gastroesophageal reflux disease, esophagitis presence not specified  omeprazole (PRILOSEC) 20 MG delayed release capsule   3. Esophageal dysphagia  Elizabeth Sánchez MD, Gastroenterology, [facility] - EGD    omeprazole (PRILOSEC) 20 MG delayed release capsule    4. Due colonoscopy - she does not want to have it done. Pros and cons addressed. Questions answered. Plan:      Side effects of current medications reviewed and questions answered.    Call or return to clinic prn if these symptoms worsen or fail to improve as anticipated.

## 2019-08-19 ENCOUNTER — TELEPHONE (OUTPATIENT)
Dept: FAMILY MEDICINE CLINIC | Age: 74
End: 2019-08-19

## 2019-08-19 NOTE — TELEPHONE ENCOUNTER
Spoke to patient and she said she received the paper work for the endoscopy and it said that they could do a biopsy and she does not want that done. I advised that patient would have to contact the physician doing the endoscopy to let them know.

## 2019-09-18 ENCOUNTER — OFFICE VISIT (OUTPATIENT)
Dept: FAMILY MEDICINE CLINIC | Age: 74
End: 2019-09-18
Payer: MEDICARE

## 2019-09-18 VITALS
OXYGEN SATURATION: 98 % | BODY MASS INDEX: 29.33 KG/M2 | WEIGHT: 159.4 LBS | DIASTOLIC BLOOD PRESSURE: 67 MMHG | HEART RATE: 66 BPM | HEIGHT: 62 IN | SYSTOLIC BLOOD PRESSURE: 107 MMHG

## 2019-09-18 DIAGNOSIS — Z23 NEED FOR VACCINATION: ICD-10-CM

## 2019-09-18 DIAGNOSIS — Z12.11 COLON CANCER SCREENING: ICD-10-CM

## 2019-09-18 DIAGNOSIS — L98.9 SKIN LESION OF FACE: Primary | ICD-10-CM

## 2019-09-18 PROCEDURE — 99213 OFFICE O/P EST LOW 20 MIN: CPT | Performed by: FAMILY MEDICINE

## 2019-09-30 DIAGNOSIS — Z12.11 COLON CANCER SCREENING: ICD-10-CM

## 2019-12-04 ENCOUNTER — OFFICE VISIT (OUTPATIENT)
Dept: FAMILY MEDICINE CLINIC | Age: 74
End: 2019-12-04
Payer: MEDICARE

## 2019-12-04 VITALS
OXYGEN SATURATION: 94 % | RESPIRATION RATE: 12 BRPM | TEMPERATURE: 97.3 F | HEART RATE: 67 BPM | DIASTOLIC BLOOD PRESSURE: 70 MMHG | WEIGHT: 163 LBS | BODY MASS INDEX: 29.81 KG/M2 | SYSTOLIC BLOOD PRESSURE: 112 MMHG

## 2019-12-04 DIAGNOSIS — J34.89 LESION OF NOSE: Primary | ICD-10-CM

## 2019-12-04 PROCEDURE — 99213 OFFICE O/P EST LOW 20 MIN: CPT | Performed by: FAMILY MEDICINE

## 2019-12-04 RX ORDER — OMEPRAZOLE 20 MG/1
20 CAPSULE, DELAYED RELEASE ORAL DAILY
Qty: 30 CAPSULE | Refills: 3 | Status: CANCELLED | OUTPATIENT
Start: 2019-12-04

## 2019-12-17 DIAGNOSIS — R13.19 ESOPHAGEAL DYSPHAGIA: ICD-10-CM

## 2019-12-17 DIAGNOSIS — K21.9 GASTROESOPHAGEAL REFLUX DISEASE, ESOPHAGITIS PRESENCE NOT SPECIFIED: ICD-10-CM

## 2019-12-17 RX ORDER — OMEPRAZOLE 20 MG/1
20 CAPSULE, DELAYED RELEASE ORAL DAILY
Qty: 30 CAPSULE | Refills: 2 | Status: SHIPPED | OUTPATIENT
Start: 2019-12-17 | End: 2020-12-02

## 2020-02-12 ENCOUNTER — OFFICE VISIT (OUTPATIENT)
Dept: FAMILY MEDICINE CLINIC | Age: 75
End: 2020-02-12
Payer: MEDICARE

## 2020-02-12 VITALS
HEIGHT: 61 IN | OXYGEN SATURATION: 96 % | RESPIRATION RATE: 12 BRPM | WEIGHT: 162 LBS | DIASTOLIC BLOOD PRESSURE: 64 MMHG | HEART RATE: 77 BPM | TEMPERATURE: 96.8 F | BODY MASS INDEX: 30.58 KG/M2 | SYSTOLIC BLOOD PRESSURE: 105 MMHG

## 2020-02-12 PROCEDURE — 99215 OFFICE O/P EST HI 40 MIN: CPT | Performed by: FAMILY MEDICINE

## 2020-02-12 ASSESSMENT — PATIENT HEALTH QUESTIONNAIRE - PHQ9
SUM OF ALL RESPONSES TO PHQ QUESTIONS 1-9: 0
2. FEELING DOWN, DEPRESSED OR HOPELESS: 0
SUM OF ALL RESPONSES TO PHQ9 QUESTIONS 1 & 2: 0
SUM OF ALL RESPONSES TO PHQ QUESTIONS 1-9: 0
1. LITTLE INTEREST OR PLEASURE IN DOING THINGS: 0

## 2020-02-12 NOTE — PROGRESS NOTES
Preoperative Consultation      Mayra Hanna  YOB: 1945    Date of Service:  2/12/2020    Vitals:    02/12/20 0936   BP: 105/64   Pulse: 77   Resp: 12   Temp: 96.8 °F (36 °C)   TempSrc: Oral   SpO2: 96%   Weight: 162 lb (73.5 kg)   Height: 5' 1\" (1.549 m)      Wt Readings from Last 2 Encounters:   02/12/20 162 lb (73.5 kg)   12/04/19 163 lb (73.9 kg)     BP Readings from Last 3 Encounters:   02/12/20 105/64   12/04/19 112/70   09/18/19 107/67        Chief Complaint   Patient presents with   [facility] Pre-op Exam     Patient is having cataract surgey on 2- and 3-4-2020 by Dr. Saritha Rangel, Fax #[phone] at [facility]. Allergies   Allergen Reactions    Other      Butazolidin- pt states was given many yrs ago for varicose veins and pt had adverse reaction     Outpatient Medications Marked as Taking for the 2/12/20 encounter (Office Visit) with Rashmi Santamaria MD   Medication Sig Dispense Refill    [facility] PO Take by mouth      omeprazole (PRILOSEC) 20 MG delayed release capsule TAKE 1 CAPSULE BY MOUTH DAILY 30 capsule 2    Glucosamine-Chondroit-Vit C-Mn (GLUCOSAMINE CHONDR 500 COMPLEX PO) Take by mouth      vitamin D3 (CHOLECALCIFEROL) 400 units TABS tablet Take 400 Units by mouth daily      Cranberry 405 MG CAPS Take 1 capsule by mouth daily       calcium carbonate (OSCAL) 500 MG TABS tablet Take 500 mg by mouth daily.  b complex vitamins capsule Take 1 capsule by mouth daily. This patient presents to the office today for a preoperative consultation at the request of surgeon, Dr. Lizabeth Bridges, who plans on performing PHACO/IOL on 2/19 and 3/4/20 at [place] [address].     Planned anesthesia: Topical anesthesia and peribulbar    Known anesthesia problems: None   Bleeding risk: No recent or remote history of abnormal bleeding  Personal or FH of DVT/PE: No    Patient objection to receiving blood products:  No    Patient Active Problem List   Diagnosis    Fibromyalgia    Tinnitus    GERD (gastroesophageal reflux disease)    Migraine    Cubital tunnel syndrome    Eyelid dermatitis, eczematous    Osteoporosis    Recurrent UTI    Osteoarthritis, knee    Osteoarthritis of thumb    Trigger finger of right thumb    Ulnar neuropathy of left upper extremity       Past Medical History:   Diagnosis Date    Bladder problem     Bleeding disorder ([address]     Fibromyalgia     Migraine     Osteoporosis     Recurrent UTI     Skin lesion of left leg 3/15/2012    Vertigo 10/23/2012     Past Surgical History:   Procedure Laterality Date    APPENDECTOMY  1970    BLADDER SUSPENSION  2/12    Dr. Juarez Favors    Right lumpectomy    COLON SURGERY  1978    benign large growth    HYSTERECTOMY  2000    Partial    POLYPECTOMY      colon     Family History   Problem Relation Age of Onset   [facility] Mother         skin    Other Mother 80        DVT, PE    High Blood Pressure Father     Stroke Father     Heart Disease Father         MI    High Cholesterol Brother     High Blood Pressure Brother     Asthma Brother      Social History     Socioeconomic History    Marital status:      Spouse name: Not on file    Number of children: 2    Years of education: Not on file    Highest education level: Not on file   Occupational History    Occupation: Music teacher2   Social Needs    Financial resource strain: Not on file    Food insecurity:     Worry: Not on file     Inability: Not on file    Transportation needs:     Medical: Not on file     Non-medical: Not on file   Tobacco Use    Smoking status: Never Smoker    Smokeless tobacco: Never Used   Substance and Sexual Activity    Alcohol use: No    Drug use: No    Sexual activity: Yes     Partners: Male   Lifestyle    Physical activity:     Days per week: Not on file     Minutes per session: Not on file    Stress: Not on file   Relationships    Social connections:     Talks on phone: Not on file     Gets together: Not on file     Attends Islam service: Not on file     Active member of club or organization: Not on file     Attends meetings of clubs or organizations: Not on file     Relationship status: Not on file    Intimate partner violence:     Fear of current or ex partner: Not on file     Emotionally abused: Not on file     Physically abused: Not on file     Forced sexual activity: Not on file   Other Topics Concern    Not on file   Social History Narrative    Not on file       Review of Systems  A comprehensive review of systems was negative except for what was noted in the HPI. Physical Exam   Constitutional: She is oriented to person, place, and time. She appears well-developed and well-nourished. No distress. HENT:   Head: Normocephalic and atraumatic. Mouth/Throat: Uvula is midline, oropharynx is clear and moist and mucous membranes are normal.   Eyes: Conjunctivae and EOM are normal. Pupils are equal, round, and reactive to light. Neck: Trachea normal and normal range of motion. Neck supple. No JVD present. Carotid bruit is not present. No mass and no thyromegaly present. Cardiovascular: Normal rate, regular rhythm, normal heart sounds and intact distal pulses. Exam reveals no gallop and no friction rub. No murmur heard. Pulmonary/Chest: Effort normal and breath sounds normal. No respiratory distress. She has no wheezes. She has no rales. Abdominal: Soft. Normal aorta and bowel sounds are normal. She exhibits no distension and no mass. There is no hepatosplenomegaly. No tenderness. Musculoskeletal: She exhibits no edema and no tenderness. Neurological: She is alert and oriented to person, place, and time. She has normal strength. No cranial nerve deficit. Coordination and gait normal.   Skin: Skin is warm and dry. No rash noted. No erythema. Psychiatric: She has a normal mood and affect.  Her behavior is normal.         Lab Review not applicable   Lab Results Component Value Date     05/06/2019    K 4.2 05/06/2019     05/06/2019    CO2 24 05/06/2019    BUN 13 05/06/2019    CREATININE 0.70 05/06/2019    GLUCOSE 104 05/06/2019    CALCIUM 8.9 05/06/2019             Assessment:       69 y.o. patient with planned surgery as above. Known risk factors for perioperative complications: None  Current medications which may produce withdrawal symptoms if withheld perioperatively: none      Plan:     1. Preoperative workup as follows: none  2. Change in medication regimen before surgery: None  3. Prophylaxis for cardiac events with perioperative beta-blockers: Not indicated  ACC/AHA indications for pre-operative beta-blocker use:    · Vascular surgery with history of postitive stress test  · Intermediate or high risk surgery with history of CAD   · Intermediate or high risk surgery with multiple clinical predictors of CAD- 2 of the following: history of compensated or prior heart failure, history of cerebrovascular disease, DM, or renal insufficiency    Routine administration of higher-dose, long-acting metoprolol in beta-blocker-naïve patients on the day of surgery, and in the absence of dose titration is associated with an overall increase in mortality. Beta-blockers should be started days to weeks prior to surgery and titrated to pulse < 70.  4. Deep vein thrombosis prophylaxis: not indicated  5.  No contraindications to planned surgery

## 2020-12-01 NOTE — PROGRESS NOTES
Subjective:      Patient ID: Dahlia Arora 77 y.o. female. The encounter diagnosis was Foot pain, right. HPI    Echo Pod complains only at might when she is trying to sleep in the right foot on the lateral aspect. Does not occur when she first lies down; occurs after in bed for a few hours. Burns, aches and throbs. Getting up and walking makes the pain go away for a while. Started 3 months ago. Was intermittent at first.  Now every night. Increased in severity. No trauma or injury. She denies fever, night sweats, weight loss. Outpatient Medications Marked as Taking for the 12/2/20 encounter (Office Visit) with Celsa Edouard MD   Medication Sig Dispense Refill    [facility] PO Take by mouth      omeprazole (PRILOSEC) 20 MG delayed release capsule TAKE 1 CAPSULE BY MOUTH DAILY 30 capsule 2    Glucosamine-Chondroit-Vit C-Mn (GLUCOSAMINE CHONDR 500 COMPLEX PO) Take by mouth      vitamin D3 (CHOLECALCIFEROL) 400 units TABS tablet Take 400 Units by mouth daily      ibuprofen (ADVIL;MOTRIN) 200 MG tablet Take 200 mg by mouth every 6 hours as needed for Pain      Cranberry 405 MG CAPS Take 1 capsule by mouth daily       calcium carbonate (OSCAL) 500 MG TABS tablet Take 500 mg by mouth daily.  b complex vitamins capsule Take 1 capsule by mouth daily.           Allergies   Allergen Reactions    Other      Butazolidin- pt states was given many yrs ago for varicose veins and pt had adverse reaction       Patient Active Problem List   Diagnosis    Fibromyalgia    Tinnitus    GERD (gastroesophageal reflux disease)    Migraine    Cubital tunnel syndrome    Eyelid dermatitis, eczematous    Osteoporosis    Recurrent UTI    Osteoarthritis, knee    Osteoarthritis of thumb    Trigger finger of right thumb    Ulnar neuropathy of left upper extremity       Past Medical History:   Diagnosis Date    Bladder problem     Bleeding disorder (HCC)     Fibromyalgia     Migraine     she has a neuroma. Options addressed. She will try over-the-counter lidocaine patch. Refer to podiatry if persists. Consider xray. Discussed. Side effects of current medications reviewed and questions answered. .Call or return to clinic prn if these symptoms worsen or fail to improve as anticipated.

## 2020-12-02 ENCOUNTER — OFFICE VISIT (OUTPATIENT)
Dept: FAMILY MEDICINE CLINIC | Age: 75
End: 2020-12-02
Payer: MEDICARE

## 2020-12-02 VITALS
SYSTOLIC BLOOD PRESSURE: 105 MMHG | BODY MASS INDEX: 30.61 KG/M2 | OXYGEN SATURATION: 97 % | HEART RATE: 71 BPM | RESPIRATION RATE: 12 BRPM | DIASTOLIC BLOOD PRESSURE: 62 MMHG | WEIGHT: 162 LBS | TEMPERATURE: 96.5 F

## 2020-12-02 PROCEDURE — 99213 OFFICE O/P EST LOW 20 MIN: CPT | Performed by: FAMILY MEDICINE

## 2020-12-02 PROCEDURE — 3288F FALL RISK ASSESSMENT DOCD: CPT | Performed by: FAMILY MEDICINE

## 2021-02-05 ENCOUNTER — TELEPHONE (OUTPATIENT)
Dept: FAMILY MEDICINE CLINIC | Age: 76
End: 2021-02-05

## 2021-02-05 DIAGNOSIS — R13.19 ESOPHAGEAL DYSPHAGIA: ICD-10-CM

## 2021-02-05 DIAGNOSIS — K21.9 GASTROESOPHAGEAL REFLUX DISEASE: ICD-10-CM

## 2021-02-05 RX ORDER — OMEPRAZOLE 20 MG/1
20 CAPSULE, DELAYED RELEASE ORAL DAILY
Qty: 30 CAPSULE | Refills: 2 | Status: SHIPPED | OUTPATIENT
Start: 2021-02-05 | End: 2021-05-10 | Stop reason: SDUPTHER

## 2021-02-05 NOTE — TELEPHONE ENCOUNTER
OK. If she has trouble with food sticking in her esophagus or change in her bowels, needs appt. Otherwise follow up if not better next week.

## 2021-02-05 NOTE — TELEPHONE ENCOUNTER
Karen Camargo called stating she would like to start taking Prilosec again. She has not been prescribed Prilosec since 2019, but she would like to start it again because she is having some reflux. Please advise. Thanks.         Medication name: omeprazole (Prilosec)   Medication dose: 20 mg  Frequency: TAKE 1 CAPSULE BY MOUTH DAILY  Quantity: 30 caps with 2 refills    Pharmacy name: [address] [facility] [phone] Cristian Meza [phone]     Last ov: 12/2/2020  Last labs: 5/6/2019      Karen Mary Jo [phone]

## 2021-03-09 ENCOUNTER — OFFICE VISIT (OUTPATIENT)
Dept: FAMILY MEDICINE CLINIC | Age: 76
End: 2021-03-09
Payer: MEDICARE

## 2021-03-09 VITALS
OXYGEN SATURATION: 96 % | SYSTOLIC BLOOD PRESSURE: 123 MMHG | TEMPERATURE: 98.6 F | HEART RATE: 75 BPM | DIASTOLIC BLOOD PRESSURE: 76 MMHG | RESPIRATION RATE: 14 BRPM
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
## 2021-03-09 DIAGNOSIS — S20.02XA CONTUSION OF LEFT BREAST, INITIAL ENCOUNTER: Primary | ICD-10-CM

## 2021-03-09 DIAGNOSIS — Z12.31 ENCOUNTER FOR SCREENING MAMMOGRAM FOR MALIGNANT NEOPLASM OF BREAST: ICD-10-CM

## 2021-03-09 PROCEDURE — 99213 OFFICE O/P EST LOW 20 MIN: CPT | Performed by: FAMILY MEDICINE

## 2021-03-09 ASSESSMENT — PATIENT HEALTH QUESTIONNAIRE - PHQ9
SUM OF ALL RESPONSES TO PHQ9 QUESTIONS 1 & 2: 0
SUM OF ALL RESPONSES TO PHQ QUESTIONS 1-9: 0

## 2021-03-09 NOTE — PROGRESS NOTES
Subjective:      Patient ID: Neo Matute 77 y.o. female. is here for evaluation for fall. OWEN Steele is here for evaluation for fall. She tripped over her accordion case 3 weeks ago. A student left his case on the floor behind her. She hit her left breast and rib cage. Developed a bruise. Is now itchy. No shortness or trouble taking a deep breath. Is improving. She also has a rash on her breast on her left breast that she thinks is eczema. Zinc helps. The rash is gone now. Outpatient Medications Marked as Taking for the 3/9/21 encounter (Office Visit) with Kelley Nicolas MD   Medication Sig Dispense Refill    omeprazole (PRILOSEC) 20 MG delayed release capsule Take 1 capsule by mouth Daily 30 capsule 2    ST JOHNS WORT PO Take by mouth      Glucosamine-Chondroit-Vit C-Mn (GLUCOSAMINE CHONDR 500 COMPLEX PO) Take by mouth      vitamin D3 (CHOLECALCIFEROL) 400 units TABS tablet Take 400 Units by mouth daily      ibuprofen (ADVIL;MOTRIN) 200 MG tablet Take 200 mg by mouth every 6 hours as needed for Pain      Cranberry 405 MG CAPS Take 1 capsule by mouth daily       calcium carbonate (OSCAL) 500 MG TABS tablet Take 500 mg by mouth daily.  b complex vitamins capsule Take 1 capsule by mouth daily.           Allergies   Allergen Reactions    Other      Butazolidin- pt states was given many yrs ago for varicose veins and pt had adverse reaction       Patient Active Problem List   Diagnosis    Fibromyalgia    Tinnitus    GERD (gastroesophageal reflux disease)    Migraine    Cubital tunnel syndrome    Eyelid dermatitis, eczematous    Osteoporosis    Recurrent UTI    Osteoarthritis, knee    Osteoarthritis of thumb    Trigger finger of right thumb    Ulnar neuropathy of left upper extremity       Past Medical History:   Diagnosis Date    Bladder problem     Bleeding disorder (HCC)     Fibromyalgia     Migraine     Osteoporosis     Recurrent UTI     Skin lesion of left leg 3/15/2012    Vertigo 10/23/2012       Past Surgical History:   Procedure Laterality Date    APPENDECTOMY  1970    BLADDER SUSPENSION  2/12    Dr. Anni Coley    Right lumpectomy    COLON SURGERY  1978    benign large growth    HYSTERECTOMY  2000    Partial    POLYPECTOMY      colon        Family History   Problem Relation Age of Onset    Cancer Mother         skin    Other Mother 80        DVT, PE    High Blood Pressure Father     Stroke Father     Heart Disease Father         MI    High Cholesterol Brother     High Blood Pressure Brother     Asthma Brother        Social History     Tobacco Use    Smoking status: Never Smoker    Smokeless tobacco: Never Used   Substance Use Topics    Alcohol use: No    Drug use: No            Review of Systems  Review of Systems    Objective:   Physical Exam  Vitals:    03/09/21 1008   BP: 123/76   Pulse: 75   Resp: 14   Temp: 98.6 °F (37 °C)   TempSrc: Temporal   SpO2: 96%       Physical Exam  NAD  Skin is warm and dry. No rash breasts, chest, back. Resolving ecchymosis left breast at 7-8 o'clock. Breasts are symmetric. No dominant, discrete, fixed  or suspicious masses are noted. No skin or nipple changes or axillary nodes. Chest is clear, no wheezing or rales. Normal symmetric air entry throughout both lung fields. Heart regular with normal rate, no murmer or gallop       Assessment:       Diagnosis Orders   1. Contusion of left breast, initial encounter  resolving   2. Fall, initial encounter  Fall prevention addressed. 3. Encounter for screening mammogram for malignant neoplasm of breast  MARY DIGITAL SCREEN W OR WO CAD BILATERAL          Plan:      Call or return to clinic prn if these symptoms worsen or fail to improve as anticipated.

## 2021-05-10 ENCOUNTER — VIRTUAL VISIT (OUTPATIENT)
Dept: FAMILY MEDICINE CLINIC | Age: 76
End: 2021-05-10
Payer: MEDICARE

## 2021-05-10 DIAGNOSIS — K21.9 GASTROESOPHAGEAL REFLUX DISEASE, UNSPECIFIED WHETHER ESOPHAGITIS PRESENT: ICD-10-CM

## 2021-05-10 DIAGNOSIS — H92.02 OTALGIA, LEFT: ICD-10-CM

## 2021-05-10 DIAGNOSIS — J98.8 RESPIRATORY TRACT INFECTION: Primary | ICD-10-CM

## 2021-05-10 DIAGNOSIS — R13.19 ESOPHAGEAL DYSPHAGIA: ICD-10-CM

## 2021-05-10 PROCEDURE — 99442 PR PHYS/QHP TELEPHONE EVALUATION 11-20 MIN: CPT | Performed by: FAMILY MEDICINE

## 2021-05-10 RX ORDER — OMEPRAZOLE 20 MG/1
20 CAPSULE, DELAYED RELEASE ORAL DAILY
Qty: 30 CAPSULE | Refills: 5 | Status: SHIPPED | OUTPATIENT
Start: 2021-05-10 | End: 2022-08-09 | Stop reason: SDUPTHER

## 2021-05-10 RX ORDER — ACETAMINOPHEN AND CODEINE PHOSPHATE 300; 30 MG/1; MG/1
1 TABLET ORAL EVERY 8 HOURS PRN
Qty: 9 TABLET | Refills: 0 | Status: SHIPPED | OUTPATIENT
Start: 2021-05-10 | End: 2021-05-13

## 2021-05-10 RX ORDER — AZITHROMYCIN 250 MG/1
250 TABLET, FILM COATED ORAL SEE ADMIN INSTRUCTIONS
Qty: 6 TABLET | Refills: 0 | Status: ON HOLD | OUTPATIENT
Start: 2021-05-10 | End: 2021-05-20 | Stop reason: HOSPADM

## 2021-05-10 NOTE — PROGRESS NOTES
[facility]  TELEPHONE Progress Note  Amalia Lowery DO      The risks and benefits of converting to a virtual visit were discussed in light of the current infectious disease epidemic. Patient also understood that insurance coverage and co-pays are up to their individual insurance plans. Patient identification was verified at the start of the visit. Breanna Yoon  1945    05/11/21    Patient location: Home address on file  Provider location: Physician's home    Chief Complaint:   Breanna Yoon is a 77 y.o. patient who is here for positive Covid test and left ear pain        HPI:   Tested positive for Covid. It said one of the doctors from [facility] medicine Ms. Rayo Vasquez. She wonders if any ear infection. Shooting pain in ear. Trying oil and Advil. Has been also experiencing Covid and was prescribed antibiotic. ROS negative for headache, vision changes, chest pain, shortness of breath, abdominal pain, urinary sx, bowel changes. Past medical, surgical, and social history reviewed. Medications and allergies reviewed. Allergies   Allergen Reactions    Other      Butazolidin- pt states was given many yrs ago for varicose veins and pt had adverse reaction     Prior to Visit Medications    Medication Sig Taking? Authorizing Provider   omeprazole (PRILOSEC) 20 MG delayed release capsule Take 1 capsule by mouth Daily Yes Kennedy Duong DO   azithromycin (ZITHROMAX) 250 MG tablet Take 1 tablet by mouth See Admin Instructions for 5 days 500mg on day 1 followed by 250mg on days 2 - 5 Yes Kennedy Duong DO   acetaminophen-codeine (TYLENOL/CODEINE #3) 300-30 MG per tablet Take 1 tablet by mouth every 8 hours as needed for Pain (ear pain) for up to 3 days. Intended supply: 5 days.  Take lowest dose possible to manage pain Yes Kennedy Duong DO   ST JOHNS WORT PO Take by mouth Yes Historical Provider, MD   Glucosamine-Chondroit-Vit C-Mn (GLUCOSAMINE CHONDR 500 COMPLEX PO) Take by mouth Yes Historical Provider, MD   vitamin D3 (CHOLECALCIFEROL) 400 units TABS tablet Take 400 Units by mouth daily Yes Historical Provider, MD   ibuprofen (ADVIL;MOTRIN) 200 MG tablet Take 200 mg by mouth every 6 hours as needed for Pain Yes Historical Provider, MD   Cranberry 405 MG CAPS Take 1 capsule by mouth daily  Yes Historical Provider, MD   calcium carbonate (OSCAL) 500 MG TABS tablet Take 500 mg by mouth daily. Yes Historical Provider, MD   b complex vitamins capsule Take 1 capsule by mouth daily. Yes Historical Provider, MD          No flowsheet data found. There were no vitals filed for this visit.    Wt Readings from Last 3 Encounters:   12/02/20 162 lb (73.5 kg)   02/12/20 162 lb (73.5 kg)   12/04/19 163 lb (73.9 kg)     BP Readings from Last 3 Encounters:   03/09/21 123/76   12/02/20 105/62   02/12/20 105/64       Patient Active Problem List   Diagnosis    Fibromyalgia    Tinnitus    GERD (gastroesophageal reflux disease)    Migraine    Cubital tunnel syndrome    Eyelid dermatitis, eczematous    Osteoporosis    Recurrent UTI    Osteoarthritis, knee    Osteoarthritis of thumb    Trigger finger of right thumb    Ulnar neuropathy of left upper extremity       Immunization History   Administered Date(s) Administered    Influenza 12/16/2011    Influenza Whole 11/26/2007    Influenza, High Dose (Fluzone 65 yrs and older) 12/07/2015    Pneumococcal Conjugate 13-valent ([id number]) 12/07/2015    Pneumococcal Polysaccharide ([id number]) 12/16/2011       Past Medical History:   Diagnosis Date    Bladder problem     Bleeding disorder ([facility] [address]     Fibromyalgia     Migraine     Osteoporosis     Recurrent UTI     Skin lesion of left leg 3/15/2012    Vertigo 10/23/2012     Past Surgical History:   Procedure Laterality Date    APPENDECTOMY  1970    BLADDER SUSPENSION  2/12    Dr. Amanda Bermudez    Right lumpectomy   [facility] benign large growth    HYSTERECTOMY  2000    Partial    POLYPECTOMY      colon     Family History   Problem Relation Age of Onset   [facility] Cancer Mother         skin    Other Mother 80        DVT, PE    High Blood Pressure Father     Stroke Father     Heart Disease Father         MI    High Cholesterol Brother     High Blood Pressure Brother     Asthma Brother      Social History     Socioeconomic History    Marital status:      Spouse name: Not on file    Number of children: 2    Years of education: Not on file    Highest education level: Not on file   Occupational History    Occupation: Music teacher2   Social Needs    Financial resource strain: Not on file    Food insecurity     Worry: Not on file     Inability: Not on file   [employer] needs     Medical: Not on file     Non-medical: Not on file   Tobacco Use    Smoking status: Never Smoker    Smokeless tobacco: Never Used   Substance and Sexual Activity    Alcohol use: No    Drug use: No    Sexual activity: Yes     Partners: Male   Lifestyle    Physical activity     Days per week: Not on file     Minutes per session: Not on file    Stress: Not on file   Relationships    Social connections     Talks on phone: Not on file     Gets together: Not on file     Attends Mosque service: Not on file     Active member of club or organization: Not on file     Attends meetings of clubs or organizations: Not on file     Relationship status: Not on file    Intimate partner violence     Fear of current or ex partner: Not on file     Emotionally abused: Not on file     Physically abused: Not on file     Forced sexual activity: Not on file   Other Topics Concern    Not on file   Social History Narrative    Not on file                       ASSESSMENT   Diagnosis Orders   1. Respiratory tract infection  azithromycin (ZITHROMAX) 250 MG tablet   2. Gastroesophageal reflux disease  omeprazole (PRILOSEC) 20 MG delayed release capsule   3. Esophageal dysphagia  omeprazole (PRILOSEC) 20 MG delayed release capsule   4. Otalgia, left  acetaminophen-codeine (TYLENOL/CODEINE #3) 300-30 MG per tablet       #1 AND #4:  Patient believes she has a very mild case of COVID-19 but is experiencing left ear pain. She requests antibiotic. Discussed the limitations of the treatment. I discussed utility of antibiotics, their possible side effects (notably antibiotic-associated diarrhea and candidiasis) and the risk to contribute to antibiotic-resistance strains of bacteria. The patient is to take after symptoms persist for more than 1 week and if experiencing the key symptoms and signs as discussed in the clinic visit. #2-3: The current medical regimen is effective;  continue present plan and medications. PLAN          Time spent on encounter (to include any same day medical record review): 16 minutes  Established E/M: 10-19 (02771), 20-29 (25613), 30-39 (68849), 40-54 (47742)   New E/M: 15-29 (80564), 30-44 (06665), 45-59 (69256), 60-74 (35796)  Telephone E/M: 5-10 (65548), 11-20 (87344), 21-30 (19810)    If applicable, see additional patient information and instructions under \"Patient Instructions. \"    No follow-ups on file. There are no Patient Instructions on file for this visit. Please note a portion of this chart was generated using dragon dictation software. Although every effort was made to ensure the accuracy of this automated transcription, some errors in transcription may have occurred. Pursuant to the emergency declaration under the [address] waiver authority and the [employer] Act, this Virtual  Visit was conducted, with patient's consent, to reduce the patient's risk of exposure to COVID-19 and provide continuity of care for an established patient.     Services were provided through a video synchronous discussion virtually to substitute for in-person clinic visit. Patient was instructed that the AVS is available on My Chart or was emailed to the patient if not on My Chart. Lab orders were emailed to patient if they do not use a [address] [facility] lab. Any work notes were sent to patient through My Chart or email.

## 2021-05-11 ENCOUNTER — TELEPHONE (OUTPATIENT)
Dept: FAMILY MEDICINE CLINIC | Age: 76
End: 2021-05-11

## 2021-05-11 DIAGNOSIS — G44.209 TENSION HEADACHE: ICD-10-CM

## 2021-05-11 DIAGNOSIS — H92.09 OTALGIA, UNSPECIFIED LATERALITY: Primary | ICD-10-CM

## 2021-05-11 RX ORDER — OXYCODONE HYDROCHLORIDE AND ACETAMINOPHEN 5; 325 MG/1; MG/1
1-2 TABLET ORAL EVERY 8 HOURS PRN
Qty: 12 TABLET | Refills: 0 | Status: SHIPPED | OUTPATIENT
Start: 2021-05-11 | End: 2021-05-11

## 2021-05-11 RX ORDER — BUTALBITAL, ASPIRIN, AND CAFFEINE 325; 50; 40 MG/1; MG/1; MG/1
1 CAPSULE ORAL EVERY 4 HOURS PRN
Qty: 24 CAPSULE | Refills: 0 | Status: SHIPPED | OUTPATIENT
Start: 2021-05-11 | End: 2021-05-14

## 2021-05-12 ENCOUNTER — HOSPITAL ENCOUNTER (EMERGENCY)
Age: 76
Discharge: HOME | DRG: 177 | End: 2021-05-12
Attending: EMERGENCY MEDICINE
Payer: MEDICARE

## 2021-05-12 VITALS
TEMPERATURE: 98.3 F | OXYGEN SATURATION: 93 % | DIASTOLIC BLOOD PRESSURE: 67 MMHG | BODY MASS INDEX: 30.61 KG/M2 | SYSTOLIC BLOOD PRESSURE: 124 MMHG | RESPIRATION RATE: 18 BRPM | WEIGHT: 162 LBS | HEART RATE: 76 BPM

## 2021-05-12 DIAGNOSIS — H92.02 LEFT EAR PAIN: Primary | ICD-10-CM

## 2021-05-12 PROCEDURE — 6360000002 HC RX W HCPCS: Performed by: PHYSICIAN ASSISTANT

## 2021-05-12 PROCEDURE — 2580000003 HC RX 258: Performed by: PHYSICIAN ASSISTANT

## 2021-05-12 PROCEDURE — 6370000000 HC RX 637 (ALT 250 FOR IP): Performed by: PHYSICIAN ASSISTANT

## 2021-05-12 PROCEDURE — 96372 THER/PROPH/DIAG INJ SC/IM: CPT

## 2021-05-12 PROCEDURE — 96365 THER/PROPH/DIAG IV INF INIT: CPT

## 2021-05-12 PROCEDURE — 96375 TX/PRO/DX INJ NEW DRUG ADDON: CPT

## 2021-05-12 PROCEDURE — 2500000003 HC RX 250 WO HCPCS: Performed by: PHYSICIAN ASSISTANT

## 2021-05-12 PROCEDURE — 99284 EMERGENCY DEPT VISIT MOD MDM: CPT

## 2021-05-12 RX ORDER — SODIUM CHLORIDE, SODIUM LACTATE, POTASSIUM CHLORIDE, CALCIUM CHLORIDE 600; 310; 30; 20 MG/100ML; MG/100ML; MG/100ML; MG/100ML
1000 INJECTION, SOLUTION INTRAVENOUS ONCE
Status: COMPLETED | OUTPATIENT
Start: 2021-05-12 | End: 2021-05-12

## 2021-05-12 RX ORDER — MORPHINE SULFATE 4 MG/ML
4 INJECTION, SOLUTION INTRAMUSCULAR; INTRAVENOUS ONCE
Status: COMPLETED | OUTPATIENT
Start: 2021-05-12 | End: 2021-05-12

## 2021-05-12 RX ORDER — GABAPENTIN 300 MG/1
300 CAPSULE ORAL ONCE
Status: COMPLETED | OUTPATIENT
Start: 2021-05-12 | End: 2021-05-12

## 2021-05-12 RX ORDER — KETOROLAC TROMETHAMINE 30 MG/ML
15 INJECTION, SOLUTION INTRAMUSCULAR; INTRAVENOUS ONCE
Status: COMPLETED | OUTPATIENT
Start: 2021-05-12 | End: 2021-05-12

## 2021-05-12 RX ADMIN — GABAPENTIN 300 MG: 300 CAPSULE ORAL at 21:48

## 2021-05-12 RX ADMIN — KETOROLAC TROMETHAMINE 15 MG: 30 INJECTION, SOLUTION INTRAMUSCULAR; INTRAVENOUS at 21:48

## 2021-05-12 RX ADMIN — MORPHINE SULFATE 4 MG: 4 INJECTION INTRAVENOUS at 20:30

## 2021-05-12 RX ADMIN — VALPROATE SODIUM 750 MG: 100 INJECTION, SOLUTION INTRAVENOUS at 22:20

## 2021-05-12 RX ADMIN — SODIUM CHLORIDE, POTASSIUM CHLORIDE, SODIUM LACTATE AND CALCIUM CHLORIDE 1000 ML: 600; 310; 30; 20 INJECTION, SOLUTION INTRAVENOUS at 21:48

## 2021-05-12 ASSESSMENT — PAIN SCALES - WONG BAKER
WONGBAKER_NUMERICALRESPONSE: 10
WONGBAKER_NUMERICALRESPONSE: 10
WONGBAKER_NUMERICALRESPONSE: 8

## 2021-05-12 ASSESSMENT — PAIN SCALES - GENERAL
PAINLEVEL_OUTOF10: 10
PAINLEVEL_OUTOF10: 8

## 2021-05-12 ASSESSMENT — PAIN DESCRIPTION - ORIENTATION: ORIENTATION: RIGHT;LEFT

## 2021-05-12 ASSESSMENT — PAIN DESCRIPTION - PROGRESSION: CLINICAL_PROGRESSION: GRADUALLY WORSENING

## 2021-05-12 ASSESSMENT — PAIN DESCRIPTION - ONSET: ONSET: PROGRESSIVE

## 2021-05-12 ASSESSMENT — PAIN DESCRIPTION - LOCATION: LOCATION: EAR

## 2021-05-12 ASSESSMENT — PAIN DESCRIPTION - FREQUENCY: FREQUENCY: CONTINUOUS

## 2021-05-12 ASSESSMENT — ENCOUNTER SYMPTOMS: SHORTNESS OF BREATH: 0

## 2021-05-12 ASSESSMENT — PAIN - FUNCTIONAL ASSESSMENT: PAIN_FUNCTIONAL_ASSESSMENT: PREVENTS OR INTERFERES WITH MANY ACTIVE NOT PASSIVE ACTIVITIES

## 2021-05-12 ASSESSMENT — PAIN DESCRIPTION - PAIN TYPE: TYPE: ACUTE PAIN

## 2021-05-13 ENCOUNTER — TELEPHONE (OUTPATIENT)
Dept: FAMILY MEDICINE CLINIC | Age: 76
End: 2021-05-13

## 2021-05-13 PROBLEM — U07.1 COVID-19 VIRUS INFECTION: Status: ACTIVE | Noted: 2021-05-13

## 2021-05-13 NOTE — ED PROVIDER NOTES
ED Attending Attestation Note     Date of evaluation: 5/12/2021    This patient was seen by the advance practice provider. I have seen and examined the patient, agree with the workup, evaluation, management and diagnosis. The care plan has been discussed. My assessment reveals 78yo F diagnosed with COVID-19 four days ago presenting today with left ear pain for the past 3-4 days. Spoke with PCP about pain and was given fioricet by PCP, also has tried OTC and opiate pain meds with no relief. Description of pain consistent with neuropathic etiology - sudden, intense, lancinating spikes that are mostly at left ear, but also radiate to the right. No hearing loss, tinnitus. No visual symptoms, no trouble speaking or swallowing, no focal motor or sensory deficits. No systemic symptoms or respiratory problems despite COVID-19. Consideration given to head CT/CTV to investigate dural venous thrombosis (COVID hypercoagulability, unusual neuropathic pain), however as there were no deficits whatsoever I did not think this scan would be informative. Description of symptoms is reminiscent of trigeminal neuralgia, aside from the bilateral nature. Pt treated with IM morphine without improvement, followed by toradol/gabapentin/depakote. At this time I am going off-service and will be signing out care to the oncoming provider. Their responsibilities will include reassessment for improvement after treatment and disposition - I expect that if her pain is controlled then she will be appropriate for discharge with follow up and return precautions.       Ariane Mahmood MD  [facility] MD Kim  05/13/21 1200

## 2021-05-13 NOTE — TELEPHONE ENCOUNTER
Terri Maria called stating she went to [facility] ED last night for headache and ear pain. She had a VV on 5/10/2021 with Dr. Maria G Prieto and was prescribed Fiornal -40 mg. She states this medication is helping some. She tested positive for Covid but states she has no other Covid sx - no fever, cough, congestion, st, or loss of taste or smell. She said they gave her morphine at the ED and it did not help. She wants to speak with Dr. Janel Quiñonez directly. She does not want to see another physician at our office. She states the pain is severe and she needs help. There are no VV appointments available today or tomorrow. Please advise. Thanks.        Terri Maria [phone] Amalia Vidal

## 2021-05-13 NOTE — TELEPHONE ENCOUNTER
Ok for VV tomorrow. Tell her to try putting over-the-counter lidocaine gel on her TMJ joint right in front of the ear.

## 2021-05-13 NOTE — ED PROVIDER NOTES
[address] [facility] ENCOUNTER          PHYSICIAN ASSISTANT NOTE       Date of evaluation: 5/12/2021    Chief Complaint     Otalgia (LEFT WORSE THAN RIGHT; COVID POS)      History of Present Illness     HPI: Fabiano Salcedo is a 77 y.o. female with history of fibromyalgia who presents to the emergency department with L ear pain. Patient tested positive for COVID last Sunday, and for the past 4 days has had worsening L ear pain. She notes that the pain comes in intermittent waves that she describes as a stabbing pain and radiates through her head into her right ear. She denies any associated changes in her vision, focal weakness or slurred speech. She denies any trauma to her head, hitting her head or lose consciousness. With the exception of the above, there are no aggravating or alleviating factors. Review of Systems     Review of Systems   Constitutional: Negative for chills and fever. HENT: Positive for ear pain. Negative for ear discharge. Eyes: Negative for visual disturbance. Respiratory: Negative for shortness of breath. Cardiovascular: Negative for chest pain. Neurological: Positive for headaches. Negative for speech difficulty, weakness and light-headedness. As stated above, all other systems reviewed and are otherwise negative. Past Medical, Surgical, Family, and Social History     She has a past medical history of Bladder problem, Bleeding disorder ([address] Fibromyalgia, Migraine, Osteoporosis, Recurrent UTI, Skin lesion of left leg, and Vertigo. She has a past surgical history that includes Hysterectomy (2000); Breast surgery (1990); polypectomy; Colon surgery (1978); bladder suspension (2/12); and Appendectomy (1970). Her family history includes Asthma in her brother; Cancer in her mother; Heart Disease in her father; High Blood Pressure in her brother and father; High Cholesterol in her brother;  Other (age of onset: 80) in her mother; Stroke in her father. She reports that she has never smoked. She has never used smokeless tobacco. She reports that she does not drink alcohol or use drugs. Medications     Previous Medications    ACETAMINOPHEN-CODEINE (TYLENOL/CODEINE #3) 300-30 MG PER TABLET    Take 1 tablet by mouth every 8 hours as needed for Pain (ear pain) for up to 3 days. Intended supply: 5 days. Take lowest dose possible to manage pain    AZITHROMYCIN (ZITHROMAX) 250 MG TABLET    Take 1 tablet by mouth See Admin Instructions for 5 days 500mg on day 1 followed by 250mg on days 2 - 5    B COMPLEX VITAMINS CAPSULE    Take 1 capsule by mouth daily. BUTALBITAL-ASPIRIN-CAFFEINE (FIORINAL) -40 MG CAPSULE    Take 1 capsule by mouth every 4 hours as needed for Headaches (ear ache.) for up to 24 doses. CALCIUM CARBONATE (OSCAL) 500 MG TABS TABLET    Take 500 mg by mouth daily. CRANBERRY 405 MG CAPS    Take 1 capsule by mouth daily     GLUCOSAMINE-CHONDROIT-VIT C-MN (GLUCOSAMINE CHONDR 500 COMPLEX PO)    Take by mouth    IBUPROFEN (ADVIL;MOTRIN) 200 MG TABLET    Take 200 mg by mouth every 6 hours as needed for Pain    OMEPRAZOLE (PRILOSEC) 20 MG DELAYED RELEASE CAPSULE    Take 1 capsule by mouth Daily    ST JOHNS WORT PO    Take by mouth    VITAMIN D3 (CHOLECALCIFEROL) 400 UNITS TABS TABLET    Take 400 Units by mouth daily       Allergies     She is allergic to other. Physical Exam     INITIAL VITALS: BP: 135/74, Temp: 98.3 °F (36.8 °C), Pulse: 76, Resp: 18, SpO2: 97 %  Physical Exam  Vitals signs and nursing note reviewed. Constitutional:       General: She is not in acute distress. Appearance: Normal appearance. She is normal weight. She is not ill-appearing, toxic-appearing or diaphoretic. Comments: Appears uncomfortable sitting on the stretcher with her left hand clasped over her left ear. HENT:      Head: Normocephalic and atraumatic.       Right Ear: Tympanic membrane, ear canal and external ear normal. There is no impacted cerumen. Left Ear: There is no impacted cerumen. Ears:      Comments: Left TM with slight increased vascularization though no increased erythema or bulging TM. TM intact , no obvious perforation. External auditory canal of left side well-appearing without any visible foreign bodies, swelling or exudates. No pain with manipulation of left tragus. No mastoid tenderness or bogginess. No overlying skin changes to left ear or mastoid. Nose: Nose normal.      Mouth/Throat:      Mouth: Mucous membranes are dry. Pharynx: Oropharynx is clear. Eyes:      Extraocular Movements: Extraocular movements intact. Conjunctiva/sclera: Conjunctivae normal.      Pupils: Pupils are equal, round, and reactive to light. Neck:      Musculoskeletal: Normal range of motion. Cardiovascular:      Rate and Rhythm: Normal rate. Pulses: Normal pulses. Pulmonary:      Effort: Pulmonary effort is normal. No respiratory distress. Musculoskeletal: Normal range of motion. Skin:     General: Skin is warm and dry. Neurological:      General: No focal deficit present. Mental Status: She is alert and oriented to person, place, and time. Comments: Basic cranial nerves intact. Grossly equal strength bilateral upper and lower extremities. Able to ambulate independently without any difficulty. Normal speech without any aphasia. No ataxia. Psychiatric:         Mood and Affect: Mood normal.         Behavior: Behavior normal.         Diagnostic Results     RADIOLOGY:  No orders to display     LABS:   No results found for this visit on 05/12/21. RECENT VITALS:  BP: 124/67, Temp: 98.3 °F (36.8 °C), Pulse: 76, Resp: 18, SpO2: 93 %     Procedures     n/a    ED Course     Nursing Notes, Past Medical Hx,Past Surgical Hx, Social Hx, Allergies, and Family Hx were reviewed.     The patient was given the following medications:  Orders Placed This Encounter   Medications    morphine (PF) injection 4 mg    valproate (DEPACON) 750 mg in dextrose 5 % 100 mL IVPB    ketorolac (TORADOL) injection 15 mg    lactated ringers infusion 1,000 mL    gabapentin (NEURONTIN) capsule 300 mg       CONSULTS:  None    MEDICAL DECISION MAKING / ASSESSMENT / PLAN     Vitals:    05/12/21 2012 05/12/21 2142 05/12/21 2154   BP: 135/74  124/67   Pulse: 76  76   Resp: 18  18   Temp: 98.3 °F (36.8 °C)     TempSrc: Oral     SpO2: 97%  93%   Weight:  162 lb (73.5 kg)        Earlene Dyson is a 77 y.o. female who presents to the emergency department with left ear pain. Patient has had left ear pain for the past 4 days. She has been speaking with her primary care provider through telehealth visits as she is currently Covid positive who placed her on azithromycin and wrote her prescription of Fioricet. Patient denies any associated auditory changes, visual changes, focal weakness. She notes that the pain starts in her left ear and intermittently shoots across to her right ear. She denies any headache outside of these shocking, stabbing-like pains. She has not had similar symptoms in the past.  The patient has remained hemodynamically stable throughout their stay in the emergency department, and appears well overall. Patient has intermittent episodes where the pain occurs and she clasps onto her left ear, wincing. Patient otherwise has an unremarkable exam with a nonfocal neurologic exam.    I have low suspicion for stroke or other acute intracranial process in this patient as she has a nonfocal neurologic exam with isolated, intermittent left-sided ear pain that she describes as stabbing in nature. Patient has no evidence of acute otitis media or otitis externa on exam as well. Concern for patient to potentially have trigeminal neuralgia. Patient was initially given 4 mg of IM morphine with no relief of her pain. Patient was then given IV Toradol, IV fluids, oral gabapentin and IV Depakote.   On reevaluation the patient is that her symptoms have become less frequent and less severe. She feels that her symptoms are significantly improved after these medications. I discussed CT imaging with the patient in regards to stroke or other acute intracranial abnormality and the patient is okay with holding off at this time. I also spoke over the phone with the patient's  and updated him on her plan of care. Given that the patient is now feeling significantly better I feel that she is appropriate for outpatient management and treatment of potential trigeminal neuralgia with her primary care provider. I advised her to message and/or call her primary care provider first thing tomorrow morning for close follow-up and prescriptions. Given that patient has never been on any gabapentin, Depakote, etc. in the past I do not feel comfortable prescribing them here from the emergency department and this is communicated to the patient. We discussed return precautions, and patient is in agreement with plan for discharge home with close follow-up. I do not believe that this patient requires any further work-up or inpatient management for their complaints, and are appropriate for outpatient follow-up. I discussed the findings of my physical exam and work-up with the patient, and they were given the opportunity to ask questions. The patient is agreeable with the plan and is ready to be discharged home. Patient instructed to follow-up with PCP as soon as possible, and given strict return precautions. The patient was evaluated by myself and the ED Attending Physician, Dr. Lilibeth Wilson. All management and disposition plans were discussed and agreed upon. Clinical Impression     1. Left ear pain        This note was dictated using voice-recognition software, which occasionally leads to inadvertent typographic errors.     Disposition     PATIENT REFERRED TO:  Travon Flanagan MD  [place] [id number]

## 2021-05-13 NOTE — PROGRESS NOTES
2021    TELEHEALTH EVALUATION -- Audio/Visual (During SVCZJ-15 public health emergency)    HPI:    Sim Cortes (:  1945) has requested an audio/video evaluation for the following concern(s):    The primary encounter diagnosis was Otalgia, left. A diagnosis of COVID-19 virus infection was also pertinent to this visit. SWITCHED TO PHONE. Pt unable to connect. Discussed possible non-payment by insurance. PT identified at start of apptMaddie Espino is here for follow up for left ear pain. She was diagnosed with COVID 19 infection on 21. She developed severe left ear pain and was prescribed Tylenol #3 and then Fioricet by Dr. Stella Elliott. The pain was so severe she went to the ER. Was felt to have trigeminal neuralgia. Was treated with morphine, gabapentin, Toradol and Depakote. Today she continues to have stabbing, shooting pain in the right > left ear. Her entire head feels sensitive. The pain occurs every 3 to 5 minutes. The medication she had in the ER was very effective. The relief lasted a few hours. She states her symptoms are unchanged. No vomiting, stiff neck, visual disturbance. The Fiorinal is effective for about 4 hours. She has no cough, dyspnea, fever. Denies loss of smell. Review of Systems    Outpatient Medications Marked as Taking for the 21 encounter (Virtual Visit) with Emily Wells MD   Medication Sig Dispense Refill    butalbital-aspirin-caffeine [facility]) -40 MG capsule Take 1 capsule by mouth every 4 hours as needed for Headaches (ear ache.) for up to 24 doses.  24 capsule 0    omeprazole (PRILOSEC) 20 MG delayed release capsule Take 1 capsule by mouth Daily 30 capsule 5    azithromycin (ZITHROMAX) 250 MG tablet Take 1 tablet by mouth See Admin Instructions for 5 days 500mg on day 1 followed by 250mg on days 2 - 5 6 tablet 0    ST JOHNS WORT PO Take by mouth      Glucosamine-Chondroit-Vit C-Mn (GLUCOSAMINE CHONDR 500 COMPLEX PO) Take by mouth      vitamin D3 (CHOLECALCIFEROL) 400 units TABS tablet Take 400 Units by mouth daily      ibuprofen (ADVIL;MOTRIN) 200 MG tablet Take 200 mg by mouth every 6 hours as needed for Pain      Cranberry 405 MG CAPS Take 1 capsule by mouth daily       calcium carbonate (OSCAL) 500 MG TABS tablet Take 500 mg by mouth daily.  b complex vitamins capsule Take 1 capsule by mouth daily. Social History     Tobacco Use    Smoking status: Never Smoker    Smokeless tobacco: Never Used   Substance Use Topics    Alcohol use: No    Drug use: No        Allergies   Allergen Reactions    Other      Butazolidin- pt states was given many yrs ago for varicose veins and pt had adverse reaction   ,   Past Medical History:   Diagnosis Date    Bladder problem     [address] [address]     Fibromyalgia     Migraine     Osteoporosis     Recurrent UTI     Skin lesion of left leg 3/15/2012    Vertigo 10/23/2012       PHYSICAL EXAMINATION:  [ INSTRUCTIONS:  \"[x]\" Indicates a positive item  \"[]\" Indicates a negative item  -- DELETE ALL ITEMS NOT EXAMINED]  Vital Signs: (As obtained by patient/caregiver or practitioner observation)    Blood pressure-  Heart rate-    Respiratory rate-    Temperature-  Pulse oximetry-   Wt Readings from Last 3 Encounters:   05/12/21 162 lb (73.5 kg)   12/02/20 162 lb (73.5 kg)   02/12/20 162 lb (73.5 kg)     Temp Readings from Last 3 Encounters:   05/12/21 98.3 °F (36.8 °C) (Oral)   03/09/21 98.6 °F (37 °C) (Temporal)   12/02/20 96.5 °F (35.8 °C) (Temporal)     BP Readings from Last 3 Encounters:   05/12/21 124/67   03/09/21 123/76   12/02/20 105/62     Pulse Readings from Last 3 Encounters:   05/12/21 76   03/09/21 75   12/02/20 71         ASSESSMENT/PLAN:  1. COVID-19 virus infection  No pulmonary symptoms. Continue to monitor    2. Otalgia, left  Side effects of current medications reviewed and questions answered. She will let me know she is doing in 2 to 3 days.    Side effects of current medications reviewed and questions answered. - pregabalin (LYRICA) 25 MG capsule; Take 1 capsule by mouth 3 times daily for 90 days. Increase to 50 mg TID in 24 - 48 hours if no pain relief. Dispense: 90 capsule; Refill: 2  - butalbital-aspirin-caffeine (FIORINAL) -40 MG capsule; Take 1 capsule by mouth every 4 hours as needed for Headaches (ear ache.) for up to 24 doses. Dispense: 24 capsule; Refill: 0    3. Tension headache    - butalbital-aspirin-caffeine (FIORINAL) -40 MG capsule; Take 1 capsule by mouth every 4 hours as needed for Headaches (ear ache.) for up to 24 doses. Dispense: 24 capsule; Refill: 0      Follow up with me in 2-3 weeks or prn. No follow-ups on file. Harry Esteves, was evaluated through a synchronous (real-time) audio-video encounter. The patient (or guardian if applicable) is aware that this is a billable service. Verbal consent to proceed has been obtained within the past 12 months. The visit was conducted pursuant to the emergency declaration under the [facility], [address] [place] [address] authority and the [employer] General Act. Patient identification was verified, and a caregiver was present when appropriate. The patient was located in a state where the provider was credentialed to provide care. Total time spent on this encounter: 10 minutes. --Mario Nicole MD on 5/13/2021 at 7:47 PM    An electronic signature was used to authenticate this note.

## 2021-05-13 NOTE — TELEPHONE ENCOUNTER
Phoned patient and scheduled her for tomorrow, vv.    Patient has about 6 fiorinal remaining. She was told to have her  go out and get lidocaine gel, 4%. She was instructed to place it in front of her left ear and up towards her temple area. Patient voiced understanding.

## 2021-05-14 ENCOUNTER — VIRTUAL VISIT (OUTPATIENT)
Dept: FAMILY MEDICINE CLINIC | Age: 76
End: 2021-05-14
Payer: MEDICARE

## 2021-05-14 DIAGNOSIS — U07.1 COVID-19 VIRUS INFECTION: ICD-10-CM

## 2021-05-14 DIAGNOSIS — H92.02 OTALGIA, LEFT: Primary | ICD-10-CM

## 2021-05-14 DIAGNOSIS — G44.209 TENSION HEADACHE: ICD-10-CM

## 2021-05-14 PROCEDURE — 99441 PR PHYS/QHP TELEPHONE EVALUATION 5-10 MIN: CPT | Performed by: FAMILY MEDICINE

## 2021-05-14 RX ORDER — BUTALBITAL, ASPIRIN, AND CAFFEINE 325; 50; 40 MG/1; MG/1; MG/1
1 CAPSULE ORAL EVERY 4 HOURS PRN
Qty: 24 CAPSULE | Refills: 0 | Status: SHIPPED | OUTPATIENT
Start: 2021-05-14 | End: 2022-08-24

## 2021-05-14 RX ORDER — PREGABALIN 25 MG/1
25 CAPSULE ORAL 3 TIMES DAILY
Qty: 90 CAPSULE | Refills: 2 | Status: SHIPPED | OUTPATIENT
Start: 2021-05-14 | End: 2022-08-24

## 2021-05-15 ENCOUNTER — APPOINTMENT (OUTPATIENT)
Dept: GENERAL RADIOLOGY | Age: 76
DRG: 177 | End: 2021-05-15
Payer: MEDICARE

## 2021-05-15 ENCOUNTER — HOSPITAL ENCOUNTER (INPATIENT)
Age: 76
LOS: 5 days | Discharge: HOME | DRG: 177 | End: 2021-05-20
Attending: STUDENT IN AN ORGANIZED HEALTH CARE EDUCATION/TRAINING PROGRAM | Admitting: INTERNAL MEDICINE
Payer: MEDICARE

## 2021-05-15 DIAGNOSIS — H92.02 EAR PAIN, LEFT: Primary | ICD-10-CM

## 2021-05-15 DIAGNOSIS — R09.02 HYPOXIA: ICD-10-CM

## 2021-05-15 DIAGNOSIS — U07.1 COVID-19: ICD-10-CM

## 2021-05-15 PROBLEM — J12.82 PNEUMONIA DUE TO COVID-19 VIRUS: Status: ACTIVE | Noted: 2021-05-15

## 2021-05-15 LAB
ANION GAP SERPL CALCULATED.3IONS-SCNC: 12 MMOL/L (ref 3–16)
BASOPHILS ABSOLUTE: 0 K/UL (ref 0–0.2)
BASOPHILS RELATIVE PERCENT: 0.3 %
BUN BLDV-MCNC: 13 MG/DL (ref 7–20)
CALCIUM SERPL-MCNC: 8 MG/DL (ref 8.3–10.6)
CHLORIDE BLD-SCNC: 98 MMOL/L (ref 99–110)
CO2: 23 MMOL/L (ref 21–32)
CREAT SERPL-MCNC: 0.6 MG/DL (ref 0.6–1.2)
EOSINOPHILS ABSOLUTE: 0 K/UL (ref 0–0.6)
EOSINOPHILS RELATIVE PERCENT: 0 %
GFR AFRICAN AMERICAN: >60
GFR NON-AFRICAN AMERICAN: >60
GLUCOSE BLD-MCNC: 100 MG/DL (ref 70–99)
GLUCOSE BLD-MCNC: 153 MG/DL (ref 70–99)
HCT VFR BLD CALC: 36.2 % (ref 36–48)
HEMOGLOBIN: 12.5 G/DL (ref 12–16)
LYMPHOCYTES ABSOLUTE: 0.3 K/UL (ref 1–5.1)
LYMPHOCYTES RELATIVE PERCENT: 15.6 %
MAGNESIUM: 1.9 MG/DL (ref 1.8–2.4)
MCH RBC QN AUTO: 31.4 PG (ref 26–34)
MCHC RBC AUTO-ENTMCNC: 34.6 G/DL (ref 31–36)
MCV RBC AUTO: 90.8 FL (ref 80–100)
MONOCYTES ABSOLUTE: 0.2 K/UL (ref 0–1.3)
MONOCYTES RELATIVE PERCENT: 8.3 %
NEUTROPHILS ABSOLUTE: 1.6 K/UL (ref 1.7–7.7)
NEUTROPHILS RELATIVE PERCENT: 75.8 %
PDW BLD-RTO: 13.6 % (ref 12.4–15.4)
PERFORMED ON: ABNORMAL
PLATELET # BLD: 100 K/UL (ref 135–450)
PMV BLD AUTO: 10 FL (ref 5–10.5)
POTASSIUM REFLEX MAGNESIUM: 3.5 MMOL/L (ref 3.5–5.1)
PRO-BNP: 158 PG/ML (ref 0–449)
RBC # BLD: 3.99 M/UL (ref 4–5.2)
SODIUM BLD-SCNC: 133 MMOL/L (ref 136–145)
TROPONIN: <0.01 NG/ML
WBC # BLD: 2.1 K/UL (ref 4–11)

## 2021-05-15 PROCEDURE — 85025 COMPLETE CBC W/AUTO DIFF WBC: CPT

## 2021-05-15 PROCEDURE — 96365 THER/PROPH/DIAG IV INF INIT: CPT

## 2021-05-15 PROCEDURE — 86140 C-REACTIVE PROTEIN: CPT

## 2021-05-15 PROCEDURE — 82728 ASSAY OF FERRITIN: CPT

## 2021-05-15 PROCEDURE — 80076 HEPATIC FUNCTION PANEL: CPT

## 2021-05-15 PROCEDURE — 84145 PROCALCITONIN (PCT): CPT

## 2021-05-15 PROCEDURE — 6360000002 HC RX W HCPCS: Performed by: STUDENT IN AN ORGANIZED HEALTH CARE EDUCATION/TRAINING PROGRAM

## 2021-05-15 PROCEDURE — 80048 BASIC METABOLIC PNL TOTAL CA: CPT

## 2021-05-15 PROCEDURE — 6370000000 HC RX 637 (ALT 250 FOR IP): Performed by: STUDENT IN AN ORGANIZED HEALTH CARE EDUCATION/TRAINING PROGRAM

## 2021-05-15 PROCEDURE — 2500000003 HC RX 250 WO HCPCS: Performed by: STUDENT IN AN ORGANIZED HEALTH CARE EDUCATION/TRAINING PROGRAM

## 2021-05-15 PROCEDURE — 85379 FIBRIN DEGRADATION QUANT: CPT

## 2021-05-15 PROCEDURE — 1200000000 HC SEMI PRIVATE

## 2021-05-15 PROCEDURE — 2580000003 HC RX 258: Performed by: STUDENT IN AN ORGANIZED HEALTH CARE EDUCATION/TRAINING PROGRAM

## 2021-05-15 PROCEDURE — 84484 ASSAY OF TROPONIN QUANT: CPT

## 2021-05-15 PROCEDURE — 96375 TX/PRO/DX INJ NEW DRUG ADDON: CPT

## 2021-05-15 PROCEDURE — 93005 ELECTROCARDIOGRAM TRACING: CPT | Performed by: STUDENT IN AN ORGANIZED HEALTH CARE EDUCATION/TRAINING PROGRAM

## 2021-05-15 PROCEDURE — 36415 COLL VENOUS BLD VENIPUNCTURE: CPT

## 2021-05-15 PROCEDURE — 99284 EMERGENCY DEPT VISIT MOD MDM: CPT

## 2021-05-15 PROCEDURE — 83735 ASSAY OF MAGNESIUM: CPT

## 2021-05-15 PROCEDURE — 71045 X-RAY EXAM CHEST 1 VIEW: CPT

## 2021-05-15 PROCEDURE — 83880 ASSAY OF NATRIURETIC PEPTIDE: CPT

## 2021-05-15 RX ORDER — DEXTROSE MONOHYDRATE 50 MG/ML
100 INJECTION, SOLUTION INTRAVENOUS PRN
Status: DISCONTINUED | OUTPATIENT
Start: 2021-05-15 | End: 2021-05-20 | Stop reason: HOSPADM

## 2021-05-15 RX ORDER — SODIUM CHLORIDE 9 MG/ML
25 INJECTION, SOLUTION INTRAVENOUS PRN
Status: DISCONTINUED | OUTPATIENT
Start: 2021-05-15 | End: 2021-05-20 | Stop reason: HOSPADM

## 2021-05-15 RX ORDER — SODIUM CHLORIDE 0.9 % (FLUSH) 0.9 %
5-40 SYRINGE (ML) INJECTION EVERY 12 HOURS SCHEDULED
Status: CANCELLED | OUTPATIENT
Start: 2021-05-15

## 2021-05-15 RX ORDER — BUTALBITAL, ASPIRIN, AND CAFFEINE 325; 50; 40 MG/1; MG/1; MG/1
1 CAPSULE ORAL EVERY 4 HOURS PRN
Status: CANCELLED | OUTPATIENT
Start: 2021-05-15

## 2021-05-15 RX ORDER — ACETAMINOPHEN 325 MG/1
650 TABLET ORAL EVERY 6 HOURS PRN
Status: DISCONTINUED | OUTPATIENT
Start: 2021-05-15 | End: 2021-05-20 | Stop reason: HOSPADM

## 2021-05-15 RX ORDER — NICOTINE POLACRILEX 4 MG
15 LOZENGE BUCCAL PRN
Status: DISCONTINUED | OUTPATIENT
Start: 2021-05-15 | End: 2021-05-20 | Stop reason: HOSPADM

## 2021-05-15 RX ORDER — SODIUM CHLORIDE 0.9 % (FLUSH) 0.9 %
5-40 SYRINGE (ML) INJECTION PRN
Status: CANCELLED | OUTPATIENT
Start: 2021-05-15

## 2021-05-15 RX ORDER — CARBAMAZEPINE 100 MG/1
100 TABLET, EXTENDED RELEASE ORAL 2 TIMES DAILY
Status: DISCONTINUED | OUTPATIENT
Start: 2021-05-15 | End: 2021-05-16

## 2021-05-15 RX ORDER — ONDANSETRON 2 MG/ML
4 INJECTION INTRAMUSCULAR; INTRAVENOUS EVERY 6 HOURS PRN
Status: DISCONTINUED | OUTPATIENT
Start: 2021-05-15 | End: 2021-05-20 | Stop reason: HOSPADM

## 2021-05-15 RX ORDER — SODIUM CHLORIDE, SODIUM LACTATE, POTASSIUM CHLORIDE, CALCIUM CHLORIDE 600; 310; 30; 20 MG/100ML; MG/100ML; MG/100ML; MG/100ML
1000 INJECTION, SOLUTION INTRAVENOUS CONTINUOUS
Status: DISCONTINUED | OUTPATIENT
Start: 2021-05-15 | End: 2021-05-15

## 2021-05-15 RX ORDER — PROMETHAZINE HYDROCHLORIDE 25 MG/1
12.5 TABLET ORAL EVERY 6 HOURS PRN
Status: DISCONTINUED | OUTPATIENT
Start: 2021-05-15 | End: 2021-05-20 | Stop reason: HOSPADM

## 2021-05-15 RX ORDER — POLYETHYLENE GLYCOL 3350 17 G/17G
17 POWDER, FOR SOLUTION ORAL DAILY PRN
Status: DISCONTINUED | OUTPATIENT
Start: 2021-05-15 | End: 2021-05-20 | Stop reason: HOSPADM

## 2021-05-15 RX ORDER — SODIUM CHLORIDE 9 MG/ML
25 INJECTION, SOLUTION INTRAVENOUS PRN
Status: CANCELLED | OUTPATIENT
Start: 2021-05-15

## 2021-05-15 RX ORDER — 0.9 % SODIUM CHLORIDE 0.9 %
30 INTRAVENOUS SOLUTION INTRAVENOUS PRN
Status: DISCONTINUED | OUTPATIENT
Start: 2021-05-15 | End: 2021-05-20 | Stop reason: HOSPADM

## 2021-05-15 RX ORDER — ONDANSETRON 2 MG/ML
4 INJECTION INTRAMUSCULAR; INTRAVENOUS EVERY 6 HOURS PRN
Status: CANCELLED | OUTPATIENT
Start: 2021-05-15

## 2021-05-15 RX ORDER — PROMETHAZINE HYDROCHLORIDE 25 MG/1
12.5 TABLET ORAL EVERY 6 HOURS PRN
Status: CANCELLED | OUTPATIENT
Start: 2021-05-15

## 2021-05-15 RX ORDER — KETOROLAC TROMETHAMINE 30 MG/ML
15 INJECTION, SOLUTION INTRAMUSCULAR; INTRAVENOUS ONCE
Status: COMPLETED | OUTPATIENT
Start: 2021-05-15 | End: 2021-05-15

## 2021-05-15 RX ORDER — ACETAMINOPHEN 325 MG/1
650 TABLET ORAL EVERY 6 HOURS PRN
Status: CANCELLED | OUTPATIENT
Start: 2021-05-15

## 2021-05-15 RX ORDER — ACETAMINOPHEN 650 MG/1
650 SUPPOSITORY RECTAL EVERY 6 HOURS PRN
Status: CANCELLED | OUTPATIENT
Start: 2021-05-15

## 2021-05-15 RX ORDER — DEXAMETHASONE SODIUM PHOSPHATE 4 MG/ML
6 INJECTION, SOLUTION INTRA-ARTICULAR; INTRALESIONAL; INTRAMUSCULAR; INTRAVENOUS; SOFT TISSUE EVERY 24 HOURS
Status: DISCONTINUED | OUTPATIENT
Start: 2021-05-15 | End: 2021-05-20 | Stop reason: HOSPADM

## 2021-05-15 RX ORDER — SODIUM CHLORIDE 0.9 % (FLUSH) 0.9 %
5-40 SYRINGE (ML) INJECTION EVERY 12 HOURS SCHEDULED
Status: DISCONTINUED | OUTPATIENT
Start: 2021-05-15 | End: 2021-05-20 | Stop reason: HOSPADM

## 2021-05-15 RX ORDER — INSULIN LISPRO 100 [IU]/ML
0-3 INJECTION, SOLUTION INTRAVENOUS; SUBCUTANEOUS NIGHTLY
Status: DISCONTINUED | OUTPATIENT
Start: 2021-05-15 | End: 2021-05-20 | Stop reason: HOSPADM

## 2021-05-15 RX ORDER — POLYETHYLENE GLYCOL 3350 17 G/17G
17 POWDER, FOR SOLUTION ORAL DAILY PRN
Status: CANCELLED | OUTPATIENT
Start: 2021-05-15

## 2021-05-15 RX ORDER — SODIUM CHLORIDE 9 MG/ML
INJECTION, SOLUTION INTRAVENOUS CONTINUOUS
Status: DISCONTINUED | OUTPATIENT
Start: 2021-05-15 | End: 2021-05-20

## 2021-05-15 RX ORDER — INSULIN LISPRO 100 [IU]/ML
0-6 INJECTION, SOLUTION INTRAVENOUS; SUBCUTANEOUS
Status: DISCONTINUED | OUTPATIENT
Start: 2021-05-16 | End: 2021-05-20 | Stop reason: HOSPADM

## 2021-05-15 RX ORDER — KETOROLAC TROMETHAMINE 15 MG/ML
15 INJECTION, SOLUTION INTRAMUSCULAR; INTRAVENOUS EVERY 6 HOURS PRN
Status: DISPENSED | OUTPATIENT
Start: 2021-05-15 | End: 2021-05-18

## 2021-05-15 RX ORDER — DEXTROSE MONOHYDRATE 25 G/50ML
12.5 INJECTION, SOLUTION INTRAVENOUS PRN
Status: DISCONTINUED | OUTPATIENT
Start: 2021-05-15 | End: 2021-05-20 | Stop reason: HOSPADM

## 2021-05-15 RX ORDER — ACETAMINOPHEN 650 MG/1
650 SUPPOSITORY RECTAL EVERY 6 HOURS PRN
Status: DISCONTINUED | OUTPATIENT
Start: 2021-05-15 | End: 2021-05-20 | Stop reason: HOSPADM

## 2021-05-15 RX ORDER — SODIUM CHLORIDE 0.9 % (FLUSH) 0.9 %
5-40 SYRINGE (ML) INJECTION PRN
Status: DISCONTINUED | OUTPATIENT
Start: 2021-05-15 | End: 2021-05-20 | Stop reason: HOSPADM

## 2021-05-15 RX ADMIN — KETOROLAC TROMETHAMINE 15 MG: 30 INJECTION, SOLUTION INTRAMUSCULAR; INTRAVENOUS at 18:16

## 2021-05-15 RX ADMIN — DEXAMETHASONE SODIUM PHOSPHATE 6 MG: 4 INJECTION, SOLUTION INTRAMUSCULAR; INTRAVENOUS at 23:39

## 2021-05-15 RX ADMIN — CARBAMAZEPINE 100 MG: 100 TABLET, EXTENDED RELEASE ORAL at 23:44

## 2021-05-15 RX ADMIN — SODIUM CHLORIDE: 9 INJECTION, SOLUTION INTRAVENOUS at 23:51

## 2021-05-15 RX ADMIN — SODIUM CHLORIDE, POTASSIUM CHLORIDE, SODIUM LACTATE AND CALCIUM CHLORIDE 1000 ML: 600; 310; 30; 20 INJECTION, SOLUTION INTRAVENOUS at 18:16

## 2021-05-15 RX ADMIN — Medication 10 ML: at 23:48

## 2021-05-15 RX ADMIN — VALPROATE SODIUM 750 MG: 100 INJECTION, SOLUTION INTRAVENOUS at 18:40

## 2021-05-15 RX ADMIN — KETOROLAC TROMETHAMINE 15 MG: 15 INJECTION, SOLUTION INTRAMUSCULAR; INTRAVENOUS at 23:37

## 2021-05-15 ASSESSMENT — PAIN DESCRIPTION - PROGRESSION
CLINICAL_PROGRESSION: GRADUALLY WORSENING
CLINICAL_PROGRESSION: GRADUALLY IMPROVING

## 2021-05-15 ASSESSMENT — PAIN DESCRIPTION - ORIENTATION
ORIENTATION: LEFT

## 2021-05-15 ASSESSMENT — PAIN DESCRIPTION - PAIN TYPE
TYPE: ACUTE PAIN
TYPE: ACUTE PAIN

## 2021-05-15 ASSESSMENT — PAIN DESCRIPTION - FREQUENCY
FREQUENCY: INTERMITTENT
FREQUENCY: CONTINUOUS

## 2021-05-15 ASSESSMENT — PAIN DESCRIPTION - ONSET
ONSET: ON-GOING
ONSET: SUDDEN

## 2021-05-15 ASSESSMENT — PAIN DESCRIPTION - LOCATION
LOCATION: EAR

## 2021-05-15 ASSESSMENT — PAIN SCALES - GENERAL
PAINLEVEL_OUTOF10: 10
PAINLEVEL_OUTOF10: 8
PAINLEVEL_OUTOF10: 10
PAINLEVEL_OUTOF10: 10

## 2021-05-15 ASSESSMENT — PAIN - FUNCTIONAL ASSESSMENT
PAIN_FUNCTIONAL_ASSESSMENT: ACTIVITIES ARE NOT PREVENTED
PAIN_FUNCTIONAL_ASSESSMENT: ACTIVITIES ARE NOT PREVENTED

## 2021-05-15 NOTE — ED NOTES
Pt placed on CMU, pulse ox, and NIBP. Pt given call light and instructed on its use. No new needs at this time. Will cont to monitor.       Louie Rios RN  05/15/21 [id number]

## 2021-05-15 NOTE — ED PROVIDER NOTES
[address] [facility] RESIDENT NOTE       Date of evaluation: 5/15/2021    Chief Complaint     Other (Pt seenon 5/12 and diagnosed with trigeminal neuralgia. pt was given lyrica and told to f/u with pcp. pt has been in increasing pain and medication only makes her drowsy not relieve pain.)      History of Present Illness     Yina Hooker is a 77 y.o. female with a history of presumed trigeminal neuralgia on the left side, fibromyalgia, osteoporosis, recurrent UTIs, history of COVID-19 noticed on 5/8/2021 who presents today with worsening ear pain. Patient states that she has shocking electric pain that is located in her left ear, shoots over her face, had previously been radiating all the way to the right ear, though she states that this is now occurring only on the left side of the face. It is excruciating, intermittently worsened, and is preventing sleep. She states that she is currently taking Fioricet as well as Lyrica with minimal improvement in symptoms. Have been seen on 5/12 for similar symptoms, at which time she received Toradol, Depakote, IV fluid, gabapentin. States that the IV medications did help at that time. Patient and  have been tested for COVID-19 on 5/8, despite absence of symptoms. Both tests returned positive. Patient states that she has had no typical Covid symptoms such as cough, fever, chills. Review of Systems     Review of systems is negative except for items mentioned above in HPI. Past Medical, Surgical, Family, and Social History     She has a past medical history of Bladder problem, Bleeding disorder ([address] Fibromyalgia, Migraine, Osteoporosis, Recurrent UTI, Skin lesion of left leg, and Vertigo. She has a past surgical history that includes Hysterectomy (2000); Breast surgery (1990); polypectomy; Colon surgery (1978); bladder suspension (2/12); and Appendectomy (1970).   Her family history includes Asthma in her brother; Cancer in her mother; Heart Disease in her father; High Blood Pressure in her brother and father; High Cholesterol in her brother; Other (age of onset: 80) in her mother; Stroke in her father. She reports that she has never smoked. She has never used smokeless tobacco. She reports that she does not drink alcohol and does not use drugs. Medications     Previous Medications    AZITHROMYCIN (ZITHROMAX) 250 MG TABLET    Take 1 tablet by mouth See Admin Instructions for 5 days 500mg on day 1 followed by 250mg on days 2 - 5    B COMPLEX VITAMINS CAPSULE    Take 1 capsule by mouth daily. BUTALBITAL-ASPIRIN-CAFFEINE (FIORINAL) -40 MG CAPSULE    Take 1 capsule by mouth every 4 hours as needed for Headaches (ear ache.) for up to 24 doses. CALCIUM CARBONATE (OSCAL) 500 MG TABS TABLET    Take 500 mg by mouth daily. CRANBERRY 405 MG CAPS    Take 1 capsule by mouth daily     GLUCOSAMINE-CHONDROIT-VIT C-MN (GLUCOSAMINE CHONDR 500 COMPLEX PO)    Take by mouth    IBUPROFEN (ADVIL;MOTRIN) 200 MG TABLET    Take 200 mg by mouth every 6 hours as needed for Pain    OMEPRAZOLE (PRILOSEC) 20 MG DELAYED RELEASE CAPSULE    Take 1 capsule by mouth Daily    PREGABALIN (LYRICA) 25 MG CAPSULE    Take 1 capsule by mouth 3 times daily for 90 days. Increase to 50 mg TID in 24 - 48 hours if no pain relief. ST JOHNS WORT PO    Take by mouth    VITAMIN D3 (CHOLECALCIFEROL) 400 UNITS TABS TABLET    Take 400 Units by mouth daily       Allergies     She is allergic to other. Physical Exam     INITIAL VITALS: BP: 113/61, Temp: 99.1 °F (37.3 °C), Pulse: 68,  , SpO2: 92 %   Physical Exam  Constitutional:       Appearance: She is well-developed. HENT:      Head: Normocephalic and atraumatic.       Ears:      Comments: Bruising to the pinna of the left ear, with tenderness to palpation of the ear, there is no erythema or open wounds  Tympanic membrane of the left ear is normal as is the auditory canal     Mouth/Throat: Pharynx: No oropharyngeal exudate or posterior oropharyngeal erythema. Cardiovascular:      Rate and Rhythm: Normal rate and regular rhythm. Pulmonary:      Effort: Pulmonary effort is normal.      Comments: Mild crackles in middle and lower lung fields without rales or wheezes  Abdominal:      General: There is no distension. Palpations: Abdomen is soft. Musculoskeletal:      Cervical back: Normal range of motion. Skin:     General: Skin is warm and dry. Neurological:      Mental Status: She is alert and oriented to person, place, and time. DiagnosticResults     EKG   Interpreted in conjunction with emergencydepartment physician No att. providers found    Indication: Hypoxia  Rate: 6 7  Rhythm: Sinus  Axis: -23  Intervals: , QRS 86, QTc 460  Clinical Impression: No acute cardiac rhythm or ischemia abnormality  Comparison: No acute change since 7/2013      RADIOLOGY:  XR CHEST PORTABLE    (Results Pending)       LABS:   No results found for this visit on 05/15/21. ED BEDSIDE ULTRASOUND:  None    RECENT VITALS:  BP: 113/61, Temp: 99.1 °F (37.3 °C), Pulse: 68, , SpO2: 92 %     Procedures     Procedures    ED Course     Nursing Notes, Past Medical Hx, Past Surgical Hx, Social Hx, Allergies, and Family Hx were reviewed. The patient was given the followingmedications:  Orders Placed This Encounter   Medications    valproate (DEPACON) 750 mg in dextrose 5 % 100 mL IVPB    ketorolac (TORADOL) injection 15 mg    lactated ringers infusion 1,000 mL       CONSULTS:  None    MEDICAL DECISION MAKING / ASSESSMENT / Felisa Jordan is a 77 y.o. female presenting due to shooting left-sided electric pain from her left ear. This appears to be consistent with a diagnosis that is presumed to trigeminal neuralgia. Inner ear looks appropriate. She has bruising to the pinna of the ear that appears to be from repeated grasping and rubbing of the ear.     I am providing Toradol, Depakote, IV fluids based on successful amelioration of pain during her last presentation on 512. In addition, I am obtaining a chest x-ray, EKG, and basic laboratory work-up given the patient's hypoxia to 88%. I suspect that this is most likely due to Covid pneumonia given her recent positivity. Anticipate admission given this fact. This patient was also evaluated by the attending physician. All care plans werediscussed and agreed upon. Clinical Impression     1. Ear pain, left    2. Hypoxia    3. COVID-19        Disposition     PATIENT REFERRED TO:  No follow-up provider specified. DISCHARGE MEDICATIONS:  New Prescriptions    No medications on file       DISPOSITION      At this time, I am going off service and will be signing out the care of this patient to my colleague Dr. Cee Rubalcava.   Their responsibilities for the continued workup and care of this patient will include:    - CXR Read  - Lab workup  - disposition (anticipate admission)       Romeo Hardy MD  Resident  05/15/21 [id number]

## 2021-05-15 NOTE — ED PROVIDER NOTES
ED Attending Attestation Note     Date of evaluation: 5/15/2021    This patient was seen by the resident. I have seen and examined the patient, agree with the workup, evaluation, management and diagnosis. The care plan has been discussed. My assessment reveals 43-year-old female with recent Covid  pneumonia and trigeminal neuralgia diagnosis who presents with shooting left-sided facial pain. Patient denies cough or shortness of breath however she was found to be hypoxic into the high 80s on arrival on room air, this quickly improved with nasal cannula. We will repeat cocktail that patient received on initial trigeminal neuralgia presentation that seems to work well for her and obtain basic laboratory work followed by admission to hospital given hypoxia and difficult to control pain.      Timi Boone MD  05/15/21 [id number]

## 2021-05-15 NOTE — ED NOTES
Bed: A02-02  Expected date:   Expected time:   Means of arrival:   Comments:  Jonah Crook, RN  05/15/21 0568

## 2021-05-15 NOTE — ED PROVIDER NOTES
[facility]          EM RESIDENT NOTE     Date of evaluation: 5/15/2021    Diagnostic Results       RADIOLOGY:  XR CHEST PORTABLE   Final Result      Mild bilateral interstitial opacities and patchy left lung groundglass opacities. Recommend correlation for mild interstitial edema or viral pneumonia.              LABS:   Results for orders placed or performed during the hospital encounter of 05/15/21   Brain Natriuretic Peptide   Result Value Ref Range    Pro- 0 - 449 pg/mL   Troponin   Result Value Ref Range    Troponin <0.01 <0.01 ng/mL   CBC Auto Differential   Result Value Ref Range    WBC 2.1 (L) 4.0 - 11.0 K/uL    RBC 3.99 (L) 4.00 - 5.20 M/uL    Hemoglobin 12.5 12.0 - 16.0 g/dL    Hematocrit 36.2 36.0 - 48.0 %    MCV 90.8 80.0 - 100.0 fL    MCH 31.4 26.0 - 34.0 pg    MCHC 34.6 31.0 - 36.0 g/dL    RDW 13.6 12.4 - 15.4 %    Platelets 553 (L) 615 - 450 K/uL    MPV 10.0 5.0 - 10.5 fL    Neutrophils % 75.8 %    Lymphocytes % 15.6 %    Monocytes % 8.3 %    Eosinophils % 0.0 %    Basophils % 0.3 %    Neutrophils Absolute 1.6 (L) 1.7 - 7.7 K/uL    Lymphocytes Absolute 0.3 (L) 1.0 - 5.1 K/uL    Monocytes Absolute 0.2 0.0 - 1.3 K/uL    Eosinophils Absolute 0.0 0.0 - 0.6 K/uL    Basophils Absolute 0.0 0.0 - 0.2 K/uL   Basic Metabolic Panel w/ Reflex to MG   Result Value Ref Range    Sodium 133 (L) 136 - 145 mmol/L    Potassium reflex Magnesium 3.5 3.5 - 5.1 mmol/L    Chloride 98 (L) 99 - 110 mmol/L    CO2 23 21 - 32 mmol/L    Anion Gap 12 3 - 16    Glucose 153 (H) 70 - 99 mg/dL    BUN 13 7 - 20 mg/dL    CREATININE 0.6 0.6 - 1.2 mg/dL    GFR Non-African American >60 >60    GFR African American >60 >60    Calcium 8.0 (L) 8.3 - 10.6 mg/dL   Magnesium   Result Value Ref Range    Magnesium 1.90 1.80 - 2.40 mg/dL       RECENT VITALS:  BP: (!) 106/57, Temp: 99.1 °F (37.3 °C), Pulse: 57, Resp: 24, SpO2: 94 %     Procedures       ED Course     The patient was given the following medications:  Orders Placed This Encounter   Medications    valproate (DEPACON) 750 mg in dextrose 5 % 100 mL IVPB    ketorolac (TORADOL) injection 15 mg    lactated ringers infusion 1,000 mL       CONSULTS:  Reginald Lambert / TONY / Geoff Keon is a 77 y.o. female patient who presented to the emergency department on 5/15/2021. This patient was initially seen by an off-going provider. Please see that provider's note for details regarding the initial history, physical exam and ED course. Care of this patient was signed out to me. At the time of turnover the following steps in the patient's evaluation were pending:   - f/u labs, imaging, reassess, dispo    Briefly, this is a 77 y.o. female with PMH significant for trigeminal neuralgia on L, fibromyalgia, osteoporosis, recurrent UTIs, history of COVID-19 noticed on 5/8/2021 who presents for ear pain. On my initial evaluation, the patient was resting comfortably in bed, saturating in the low 90s on 2 L nasal cannula without acute respiratory complaints. She notes that after the Depakote, her ear pain seems to be improving. Chest x-ray returned concerning for possible viral pneumonia, consistent with history of COVID-19 infection. I did reassess the patient's ear at this time, possible concern for Arleen Hunt syndrome. I notes no lesions in the auditory canal although she does have 2 lesions on her left pinna. I spoke with the admitting resident team regarding this finding and they noted that they would evaluate further. At this time the patient has been admitted to hospitalist for further evaluation and management of hypoxemia/COVID-19. The patient will continue to be monitored here in the emergency department until which time she is moved to her new treatment location. This patient was also evaluated by the attending physician. All care plans were discussed and agreed upon.     Clinical Impression 1. Ear pain, left    2. Hypoxia    3. COVID-19        Disposition     PATIENT REFERRED TO:  No follow-up provider specified.     DISCHARGE MEDICATIONS:  New Prescriptions    No medications on file       DISPOSITION Admitted 05/15/2021 08:52:55 Moisés Maharaj MD, MD   Emergency Medicine, PGY-2       Usman Soriano MD  Resident  05/15/21 5070

## 2021-05-16 ENCOUNTER — APPOINTMENT (OUTPATIENT)
Dept: MRI IMAGING | Age: 76
DRG: 177 | End: 2021-05-16
Payer: MEDICARE

## 2021-05-16 LAB
ALBUMIN SERPL-MCNC: 3.2 G/DL (ref 3.4–5)
ALP BLD-CCNC: 57 U/L (ref 40–129)
ALT SERPL-CCNC: 16 U/L (ref 10–40)
ANION GAP SERPL CALCULATED.3IONS-SCNC: 12 MMOL/L (ref 3–16)
AST SERPL-CCNC: 38 U/L (ref 15–37)
BASOPHILS ABSOLUTE: 0 K/UL (ref 0–0.2)
BASOPHILS RELATIVE PERCENT: 0.7 %
BILIRUB SERPL-MCNC: <0.2 MG/DL (ref 0–1)
BILIRUBIN DIRECT: <0.2 MG/DL (ref 0–0.3)
BILIRUBIN, INDIRECT: ABNORMAL MG/DL (ref 0–1)
BUN BLDV-MCNC: 10 MG/DL (ref 7–20)
C-REACTIVE PROTEIN: 70.1 MG/L (ref 0–5.1)
CALCIUM SERPL-MCNC: 7.6 MG/DL (ref 8.3–10.6)
CHLORIDE BLD-SCNC: 99 MMOL/L (ref 99–110)
CO2: 23 MMOL/L (ref 21–32)
CREAT SERPL-MCNC: 0.6 MG/DL (ref 0.6–1.2)
D DIMER: 406 NG/ML DDU (ref 0–229)
EKG ATRIAL RATE: 67 BPM
EKG DIAGNOSIS: NORMAL
EKG P AXIS: 15 DEGREES
EKG P-R INTERVAL: 154 MS
EKG Q-T INTERVAL: 436 MS
EKG QRS DURATION: 86 MS
EKG QTC CALCULATION (BAZETT): 460 MS
EKG R AXIS: -23 DEGREES
EKG T AXIS: 13 DEGREES
EKG VENTRICULAR RATE: 67 BPM
EOSINOPHILS ABSOLUTE: 0 K/UL (ref 0–0.6)
EOSINOPHILS RELATIVE PERCENT: 0 %
FERRITIN: 619.5 NG/ML (ref 15–150)
GFR AFRICAN AMERICAN: >60
GFR NON-AFRICAN AMERICAN: >60
GLUCOSE BLD-MCNC: 102 MG/DL (ref 70–99)
GLUCOSE BLD-MCNC: 120 MG/DL (ref 70–99)
GLUCOSE BLD-MCNC: 134 MG/DL (ref 70–99)
GLUCOSE BLD-MCNC: 168 MG/DL (ref 70–99)
GLUCOSE BLD-MCNC: 97 MG/DL (ref 70–99)
HCT VFR BLD CALC: 34.1 % (ref 36–48)
HEMOGLOBIN: 12 G/DL (ref 12–16)
LYMPHOCYTES ABSOLUTE: 0.3 K/UL (ref 1–5.1)
LYMPHOCYTES RELATIVE PERCENT: 17.1 %
MAGNESIUM: 1.8 MG/DL (ref 1.8–2.4)
MCH RBC QN AUTO: 31.7 PG (ref 26–34)
MCHC RBC AUTO-ENTMCNC: 35 G/DL (ref 31–36)
MCV RBC AUTO: 90.4 FL (ref 80–100)
MONOCYTES ABSOLUTE: 0.1 K/UL (ref 0–1.3)
MONOCYTES RELATIVE PERCENT: 5.4 %
NEUTROPHILS ABSOLUTE: 1.5 K/UL (ref 1.7–7.7)
NEUTROPHILS RELATIVE PERCENT: 76.8 %
PDW BLD-RTO: 13.5 % (ref 12.4–15.4)
PERFORMED ON: ABNORMAL
PERFORMED ON: NORMAL
PLATELET # BLD: 91 K/UL (ref 135–450)
PMV BLD AUTO: 10 FL (ref 5–10.5)
POTASSIUM REFLEX MAGNESIUM: 3.5 MMOL/L (ref 3.5–5.1)
PROCALCITONIN: 0.1 NG/ML (ref 0–0.15)
RBC # BLD: 3.77 M/UL (ref 4–5.2)
SEDIMENTATION RATE, ERYTHROCYTE: 14 MM/HR (ref 0–30)
SODIUM BLD-SCNC: 134 MMOL/L (ref 136–145)
TOTAL PROTEIN: 5.6 G/DL (ref 6.4–8.2)
VITAMIN D 25-HYDROXY: 71.1 NG/ML
WBC # BLD: 2 K/UL (ref 4–11)

## 2021-05-16 PROCEDURE — A9579 GAD-BASE MR CONTRAST NOS,1ML: HCPCS | Performed by: NURSE PRACTITIONER

## 2021-05-16 PROCEDURE — 94150 VITAL CAPACITY TEST: CPT

## 2021-05-16 PROCEDURE — 85652 RBC SED RATE AUTOMATED: CPT

## 2021-05-16 PROCEDURE — 85025 COMPLETE CBC W/AUTO DIFF WBC: CPT

## 2021-05-16 PROCEDURE — 6360000004 HC RX CONTRAST MEDICATION: Performed by: NURSE PRACTITIONER

## 2021-05-16 PROCEDURE — 6360000002 HC RX W HCPCS: Performed by: STUDENT IN AN ORGANIZED HEALTH CARE EDUCATION/TRAINING PROGRAM

## 2021-05-16 PROCEDURE — 6370000000 HC RX 637 (ALT 250 FOR IP): Performed by: STUDENT IN AN ORGANIZED HEALTH CARE EDUCATION/TRAINING PROGRAM

## 2021-05-16 PROCEDURE — 2580000003 HC RX 258: Performed by: STUDENT IN AN ORGANIZED HEALTH CARE EDUCATION/TRAINING PROGRAM

## 2021-05-16 PROCEDURE — 94761 N-INVAS EAR/PLS OXIMETRY MLT: CPT

## 2021-05-16 PROCEDURE — 83735 ASSAY OF MAGNESIUM: CPT

## 2021-05-16 PROCEDURE — 80048 BASIC METABOLIC PNL TOTAL CA: CPT

## 2021-05-16 PROCEDURE — 2060000000 HC ICU INTERMEDIATE R&B

## 2021-05-16 PROCEDURE — 70553 MRI BRAIN STEM W/O & W/DYE: CPT

## 2021-05-16 PROCEDURE — 2700000000 HC OXYGEN THERAPY PER DAY

## 2021-05-16 PROCEDURE — 82306 VITAMIN D 25 HYDROXY: CPT

## 2021-05-16 PROCEDURE — 2500000003 HC RX 250 WO HCPCS: Performed by: STUDENT IN AN ORGANIZED HEALTH CARE EDUCATION/TRAINING PROGRAM

## 2021-05-16 PROCEDURE — 36415 COLL VENOUS BLD VENIPUNCTURE: CPT

## 2021-05-16 PROCEDURE — XW033E5 INTRODUCTION OF REMDESIVIR ANTI-INFECTIVE INTO PERIPHERAL VEIN, PERCUTANEOUS APPROACH, NEW TECHNOLOGY GROUP 5: ICD-10-PCS | Performed by: INTERNAL MEDICINE

## 2021-05-16 PROCEDURE — 6360000002 HC RX W HCPCS: Performed by: INTERNAL MEDICINE

## 2021-05-16 RX ORDER — CARBAMAZEPINE 100 MG/1
400 TABLET, EXTENDED RELEASE ORAL 2 TIMES DAILY
Status: DISCONTINUED | OUTPATIENT
Start: 2021-05-16 | End: 2021-05-17

## 2021-05-16 RX ORDER — PANTOPRAZOLE SODIUM 40 MG/10ML
40 INJECTION, POWDER, LYOPHILIZED, FOR SOLUTION INTRAVENOUS DAILY
Status: DISCONTINUED | OUTPATIENT
Start: 2021-05-16 | End: 2021-05-17 | Stop reason: ALTCHOICE

## 2021-05-16 RX ADMIN — CARBAMAZEPINE 400 MG: 100 TABLET, EXTENDED RELEASE ORAL at 21:11

## 2021-05-16 RX ADMIN — GADOTERIDOL 15 ML: 279.3 INJECTION, SOLUTION INTRAVENOUS at 14:47

## 2021-05-16 RX ADMIN — Medication 10 ML: at 08:50

## 2021-05-16 RX ADMIN — REMDESIVIR 200 MG: 100 INJECTION, POWDER, LYOPHILIZED, FOR SOLUTION INTRAVENOUS at 01:01

## 2021-05-16 RX ADMIN — KETOROLAC TROMETHAMINE 15 MG: 15 INJECTION, SOLUTION INTRAMUSCULAR; INTRAVENOUS at 06:12

## 2021-05-16 RX ADMIN — ENOXAPARIN SODIUM 30 MG: 30 INJECTION SUBCUTANEOUS at 21:13

## 2021-05-16 RX ADMIN — KETOROLAC TROMETHAMINE 15 MG: 15 INJECTION, SOLUTION INTRAMUSCULAR; INTRAVENOUS at 14:55

## 2021-05-16 RX ADMIN — INSULIN LISPRO 1 UNITS: 100 INJECTION, SOLUTION INTRAVENOUS; SUBCUTANEOUS at 18:20

## 2021-05-16 RX ADMIN — ENOXAPARIN SODIUM 30 MG: 30 INJECTION SUBCUTANEOUS at 08:49

## 2021-05-16 RX ADMIN — Medication 10 ML: at 21:09

## 2021-05-16 ASSESSMENT — ENCOUNTER SYMPTOMS
ABDOMINAL PAIN: 0
BACK PAIN: 0
DIARRHEA: 0
SHORTNESS OF BREATH: 0
COUGH: 0
COLOR CHANGE: 0
CONSTIPATION: 0
SINUS PAIN: 0
EYE PAIN: 0

## 2021-05-16 ASSESSMENT — PAIN SCALES - GENERAL
PAINLEVEL_OUTOF10: 0
PAINLEVEL_OUTOF10: 5
PAINLEVEL_OUTOF10: 0
PAINLEVEL_OUTOF10: 0
PAINLEVEL_OUTOF10: 6
PAINLEVEL_OUTOF10: 4
PAINLEVEL_OUTOF10: 0
PAINLEVEL_OUTOF10: 6
PAINLEVEL_OUTOF10: 0
PAINLEVEL_OUTOF10: 5
PAINLEVEL_OUTOF10: 0

## 2021-05-16 ASSESSMENT — PAIN DESCRIPTION - PROGRESSION
CLINICAL_PROGRESSION: GRADUALLY WORSENING

## 2021-05-16 ASSESSMENT — PAIN DESCRIPTION - ONSET: ONSET: ON-GOING

## 2021-05-16 ASSESSMENT — VISUAL ACUITY: OU: 1

## 2021-05-16 ASSESSMENT — PAIN DESCRIPTION - ORIENTATION: ORIENTATION: LEFT

## 2021-05-16 ASSESSMENT — PAIN - FUNCTIONAL ASSESSMENT: PAIN_FUNCTIONAL_ASSESSMENT: ACTIVITIES ARE NOT PREVENTED

## 2021-05-16 ASSESSMENT — PAIN DESCRIPTION - FREQUENCY: FREQUENCY: INTERMITTENT

## 2021-05-16 ASSESSMENT — PAIN DESCRIPTION - PAIN TYPE: TYPE: ACUTE PAIN

## 2021-05-16 ASSESSMENT — PAIN DESCRIPTION - DESCRIPTORS: DESCRIPTORS: SHOOTING

## 2021-05-16 ASSESSMENT — PAIN DESCRIPTION - LOCATION: LOCATION: EAR

## 2021-05-16 NOTE — PROGRESS NOTES
Internal Medicine Progress Note  Patient Name: Jeromy Gan   Patient : 1945   Date: 2021   Admit Date: 5/15/2021     CC: Left ear pain    Interval history: No events since admission. She has been saturating 92% of 2L supplemental oxygen. Otherwise vitals have been stable and WNL. Mild hyponatremia (Na 134). Elevated CRP (70.1). Pancytopenia (WBC 2.1, RBC 3.99, platelets 662). This AM she is still experiencing left ear pain, which she says has a \"stabbing\" quality (comes and goes). She does not feel subjective dyspnea. No other acute complaints at this time. Review of Systems   Constitutional: Negative for fatigue and fever. HENT: Positive for ear pain. Negative for sinus pain. Left ear pain as described in HPI from admission. Eyes: Negative for pain. Respiratory: Negative for cough and shortness of breath. Cardiovascular: Negative for chest pain. Gastrointestinal: Negative for abdominal pain, constipation and diarrhea. Endocrine: Negative for polyuria. Genitourinary: Negative for flank pain and pelvic pain. Musculoskeletal: Negative for back pain. Skin: Negative for color change. Neurological: Negative for dizziness and headaches. Psychiatric/Behavioral: Negative for agitation, behavioral problems and confusion. Physical Exam:  Patient Vitals for the past 8 hrs:   BP Temp Temp src Pulse Resp SpO2   21 1205 -- -- -- -- -- 90 %   21 1134 (!) 144/76 98.1 °F (36.7 °C) Oral 82 16 90 %   21 0850 105/65 97.8 °F (36.6 °C) Oral 66 16 94 %   21 0609 114/70 97.9 °F (36.6 °C) Oral 67 18 92 %     Physical Exam  Constitutional:       General: She is awake. She is not in acute distress. Appearance: Normal appearance. HENT:      Head: Normocephalic and atraumatic. Ears:      Comments: There is a dark area of bruising on the pinna of the left ear. Nose: Nose normal.   Eyes:      General: Vision grossly intact. Gaze aligned appropriately. Right eye: No discharge. Left eye: No discharge. Extraocular Movements: Extraocular movements intact. Conjunctiva/sclera: Conjunctivae normal.   Cardiovascular:      Rate and Rhythm: Normal rate and regular rhythm. Pulses: Normal pulses. Heart sounds: Normal heart sounds. Pulmonary:      Effort: Pulmonary effort is normal.      Breath sounds: Normal breath sounds. Abdominal:      General: There is no distension. There are no signs of injury. Palpations: Abdomen is soft. Tenderness: There is no abdominal tenderness. Musculoskeletal:         General: No deformity or signs of injury. Normal range of motion. Cervical back: Full passive range of motion without pain, normal range of motion and neck supple. Skin:     General: Skin is warm. Neurological:      General: No focal deficit present. Mental Status: She is alert, oriented to person, place, and time and easily aroused. Mental status is at baseline. Motor: Motor function is intact. Coordination: Coordination is intact. Gait: Gait is intact. Psychiatric:         Attention and Perception: Attention and perception normal.         Mood and Affect: Mood and affect normal.         Speech: Speech normal.         Behavior: Behavior normal. Behavior is cooperative. Thought Content:  Thought content normal.         Cognition and Memory: Cognition normal.         Judgment: Judgment normal.         Intake/Output Summary (Last 24 hours) at 5/16/2021 1306  Last data filed at 5/16/2021 0657  Gross per 24 hour   Intake 1100 ml   Output 350 ml   Net 750 ml      Medications:   carBAMazepine  400 mg Oral BID    enoxaparin  30 mg Subcutaneous BID    pantoprazole  40 mg Intravenous Daily    sodium chloride flush  5-40 mL Intravenous 2 times per day    insulin lispro  0-6 Units Subcutaneous TID WC    insulin lispro  0-3 Units Subcutaneous Nightly    dexamethasone  6 mg Intravenous Q24H    [START ON 5/17/2021] remdesivir IVPB  100 mg Intravenous Q24H       sodium chloride      dextrose      sodium chloride 100 mL/hr at 05/15/21 2351      sodium chloride flush, sodium chloride, promethazine **OR** ondansetron, polyethylene glycol, acetaminophen **OR** acetaminophen, glucose, dextrose, glucagon (rDNA), dextrose, ketorolac, sodium chloride     Labs:  CBC:   Recent Labs     05/15/21  1822 05/16/21  0451   WBC 2.1* 2.0*   HGB 12.5 12.0   HCT 36.2 34.1*   * 91*   MCV 90.8 90.4     Renal:    Recent Labs     05/15/21  1822 05/16/21  0451   * 134*   K 3.5 3.5   CL 98* 99   CO2 23 23   BUN 13 10   CREATININE 0.6 0.6   GLUCOSE 153* 120*   CALCIUM 8.0* 7.6*   MG 1.90 1.80   ANIONGAP 12 12     Hepatic:   Recent Labs     05/15/21  2355   AST 38*   ALT 16   BILITOT <0.2   BILIDIR <0.2   PROT 5.6*   LABALBU 3.2*   ALKPHOS 57     Troponin:   Recent Labs     05/15/21  1822   TROPONINI <0.01     Radiology:  XR CHEST PORTABLE   Final Result      Mild bilateral interstitial opacities and patchy left lung groundglass opacities. Recommend correlation for mild interstitial edema or viral pneumonia. MRI BRAIN W WO CONTRAST    (Results Pending)     Assessment and Plan:    76F PMH Covid PNA, fibromyalgia, osteoporosis, osteoarthritis, migraine, GERD, recurrent UTI, urinary retention, retrocele, cystocele who presented 05/16 w/ left ear pain. 1. Covid PNA  - Diagnosed 05/08/21.  - D-dimer 406 at admission.  - Ferritin 619 at admission.  - Plan: Remdesivir 100 mg QD. Decadron 6 mg QD. Supplemental O2 with target SPO2 90-96%, wean as tolerated. Incentive spirometry. 2. Trigeminal neuralgia  - Left ear pain at admission.  - Differential includes migraine vs. giant cell arteritis vs. fibromyalgia. - Plan: Carbamazepine 400 mg BID. F/U MRI-brain w/ w/o contrast. Will d/c tordol starting on 05/18. 3. T2DM  - No A1c in chart. - Glucose 100-153 since admission.  - Plan: LDSSI. F/U A1c lab.     4. Pancytopenia  - Etiology unclear. Possibly 2/2 Covid PNA.   - WBC 2.0, RBC 3.77, platelets 91 at admission.  - Plan: Monitor CBC while on carbamazepine. F/U HIV screen, KENNEDI. 5. GI PPX  - Protonix 40 mg QD. 6. DVT PPX  - Increased clot risk in setting of Covid. - Plan: Lovenox 30 mg BID. 7. Diet  - General diet. 8. Disposition  - Pre-admission: Lives at home w/ .  - Plan: Anticipate d/c to home, possibly w/ home O2. Patient discussed with attending physician Dr. Annia Schneider. Tunde Andrews DO, PhD  Internal Medicine Resident (PGY-2)  [facility] [place] [id number]

## 2021-05-16 NOTE — ED NOTES
RN attempted to call report. Nurse in pt room upstairs and unable to come to phone.       Ana Maria Mario RN  05/15/21 5419

## 2021-05-16 NOTE — ED NOTES
[facility] Questions answered, care transferred. Pt in NAD, RR even and unlabored.  VSS for transport     Pilar Hernandez [facility]  05/15/21 0608

## 2021-05-16 NOTE — PLAN OF CARE
Problem: Pain:  Goal: Pain level will decrease  Description: Pain level will decrease  5/16/2021 1723 by Judith Kern RN  Outcome: Ongoing  5/16/2021 0757 by Felipe Roman RN  Outcome: Ongoing  Goal: Control of acute pain  Description: Control of acute pain  5/16/2021 1723 by Judith Kern RN  Outcome: Ongoing  5/16/2021 0757 by Felipe Roman RN  Outcome: Ongoing  Goal: Control of chronic pain  Description: Control of chronic pain  5/16/2021 1723 by Judith Kern RN  Outcome: Ongoing  5/16/2021 0757 by Felipe Roman RN  Outcome: Ongoing     Problem: Airway Clearance - Ineffective  Goal: Achieve or maintain patent airway  5/16/2021 1723 by Judith Kern RN  Outcome: Ongoing  5/16/2021 0757 by Felipe Roman RN  Outcome: Ongoing     Problem: Gas Exchange - Impaired  Goal: Absence of hypoxia  5/16/2021 1723 by Judith Kern RN  Outcome: Ongoing  5/16/2021 0757 by Felipe Roman RN  Outcome: Ongoing  Goal: Promote optimal lung function  5/16/2021 1723 by Judith Kern RN  Outcome: Ongoing  5/16/2021 0757 by Felipe Roman RN  Outcome: Ongoing

## 2021-05-16 NOTE — PLAN OF CARE
Problem: Pain:  Goal: Pain level will decrease  Description: Pain level will decrease  Outcome: Ongoing   Pt complained of severe shooting ear pain. Was treated with PRN toradol. Pt stated it was very effective. Problem: Gas Exchange - Impaired  Goal: Absence of hypoxia  Outcome: Ongoing   Pt on RA would de sat do 88%. Pt remains on 2L NC. Problem: Risk for Fluid Volume Deficit  Goal: Maintain normal heart rhythm  Outcome: Ongoing   Pt remains NSR.

## 2021-05-16 NOTE — H&P
Internal Medicine  PGY 1  History & Physical      CC: left ear pain     HistoryObtained From:  patient    HISTORY OF PRESENT ILLNESS:  Ced Burkett is a 77 y.o. female with PMH of presumed left-sided trigeminal neuralgia, fibromyalgia, osteoporosis, and recent positive COVID-19 result (5/8) who p/w left-sided ear pain. She describes the pain to initially start on the left side has a shocking electric pain that shoots across her face to her right ear. She states the pain has now deescalated to just the left side now. She states the pain is severe, 10/10, intermittent, lasting only a few seconds, and prevents her from sleeping. She has tried taking Fioricet and Lyrica but has minimal improvement of her symptoms. Patient was recently hospitalized for similar presentation except the pain was bilaterally on both ears. She was given IV Toradol, Depakote, Neurontin and IVF which seemed to help somewhat with her pain. She denies any tinnitus, fever, chills, rash, hearing loss, vision changes, jaw pain, chest pain or shortness of breath.      ED course:   Vitals: wnl  Exam: Tenderness on palpation of left pinna, normal TM & auditory canal; mild crackles in middle & lower lung fields  Labs: Hyponatremic 133, hypglycemia 153, WBC 2.1  Imaging: CXR - bilateral interstitial ground glass opacities   Interventions: IV Toradol  & Depacon, 1 liter of LR    Past Medical History:        Diagnosis Date    Bladder problem     Bleeding disorder (HCC)     Fibromyalgia     Migraine     Osteoporosis     Recurrent UTI     Skin lesion of left leg 3/15/2012    Vertigo 10/23/2012       Past Surgical History:        Procedure Laterality Date    APPENDECTOMY  1970    BLADDER SUSPENSION  2/12    Dr. Toñito Perkins    Right lumpectomy    COLON SURGERY  1978    benign large growth    HYSTERECTOMY  2000    Partial    POLYPECTOMY      colon       Medications Prior to Admission:    · Medications Prior to Admission: pregabalin (LYRICA) 25 MG capsule, Take 1 capsule by mouth 3 times daily for 90 days. Increase to 50 mg TID in 24 - 48 hours if no pain relief. · butalbital-aspirin-caffeine (FIORINAL) -40 MG capsule, Take 1 capsule by mouth every 4 hours as needed for Headaches (ear ache.) for up to 24 doses. · omeprazole (PRILOSEC) 20 MG delayed release capsule, Take 1 capsule by mouth Daily  · [facility] WORT PO, Take by mouth  · Glucosamine-Chondroit-Vit C-Mn (GLUCOSAMINE CHONDR 500 COMPLEX PO), Take by mouth  · vitamin D3 (CHOLECALCIFEROL) 400 units TABS tablet, Take 400 Units by mouth daily  · ibuprofen (ADVIL;MOTRIN) 200 MG tablet, Take 200 mg by mouth every 6 hours as needed for Pain  · Cranberry 405 MG CAPS, Take 1 capsule by mouth daily   · calcium carbonate (OSCAL) 500 MG TABS tablet, Take 500 mg by mouth daily. · b complex vitamins capsule, Take 1 capsule by mouth daily. · [] azithromycin (ZITHROMAX) 250 MG tablet, Take 1 tablet by mouth See Admin Instructions for 5 days 500mg on day 1 followed by 250mg on days 2 - 5  ·   Allergies: Other    Social History:   · TOBACCO: reports that she has never smoked. She has never used smokeless tobacco.  · ETOH:   reports no history of alcohol use. · DRUGS : no illicit drug use  · Patient currently lives with family. Family History:       Problem Relation Age of Onset    Cancer Mother         skin    Other Mother 80        DVT, PE    High Blood Pressure Father     Stroke Father     Heart Disease Father         MI    High Cholesterol Brother     High Blood Pressure Brother     Asthma Brother    ·     Review of Systems: A 10 point review of systems was conducted, significant findings as noted in HPI. Physical Exam  Constitutional:       Appearance: Normal appearance. HENT:      Head: Normocephalic and atraumatic. Left Ear: Hearing, tympanic membrane and ear canal normal. Tenderness present.       Ears:        Comments: Ecchymosis & wound on left pinna   Cardiovascular:      Rate and Rhythm: Normal rate and regular rhythm. Pulses: Normal pulses. Heart sounds: Normal heart sounds. Pulmonary:      Effort: Pulmonary effort is normal.      Breath sounds: Rales (bilateral lower lobes) present. Abdominal:      General: Bowel sounds are normal.      Palpations: Abdomen is soft. Skin:     General: Skin is warm and dry. Capillary Refill: Capillary refill takes less than 2 seconds. Neurological:      General: No focal deficit present. Mental Status: She is alert and oriented to person, place, and time. Mental status is at baseline. Vitals:    05/15/21 2213   BP: 103/64   Pulse: 62   Resp: 18   Temp: 97.9 °F (36.6 °C)   SpO2: 92%       DATA:    Labs:  BMP:   Recent Labs     05/15/21  1822   *   K 3.5   CL 98*   CO2 23   BUN 13   CREATININE 0.6   GLUCOSE 153*     CBC:   Recent Labs     05/15/21  1822   WBC 2.1*   HGB 12.5   HCT 36.2   *       LFT's:   Recent Labs     05/15/21  2355   AST 38*   ALT 16   BILITOT <0.2   ALKPHOS 57     Troponin:   Recent Labs     05/15/21  1822   TROPONINI <0.01     BNP: No results for input(s): BNP in the last 72 hours. ABGs: No results for input(s): PHART, ERB2GMC, PO2ART in the last 72 hours. INR: No results for input(s): INR in the last 72 hours. U/A:No results for input(s): NITRITE, COLORU, PHUR, LABCAST, WBCUA, RBCUA, MUCUS, TRICHOMONAS, YEAST, BACTERIA, CLARITYU, SPECGRAV, LEUKOCYTESUR, UROBILINOGEN, BILIRUBINUR, BLOODU, GLUCOSEU, AMORPHOUS in the last 72 hours. Invalid input(s): KETONESU    XR CHEST PORTABLE   Final Result      Mild bilateral interstitial opacities and patchy left lung groundglass opacities. Recommend correlation for mild interstitial edema or viral pneumonia. ASSESSMENT AND PLAN:  Jeromy Gan is a 77 y.o. female presents with left-sided ear pain and was admitted for acute hypoxic respiratory failure 2/2 COVID pneumonia.     Acute Hypoxic Respiratory Failure 2/2 COVID PNA  - Supplemental oxygen, wean as tolerated; keep SpO2 >88%  - Decadron 6 mg x 10 days  - Redemsivir 200 mg x 5 days  - Trending inflammatory markers    COVID-19 PNA  Positive result on 5/8/21. Imaging revealing bilateral ground glass opacities. Lymphopenia & elevated inflammatory markers. - See treatment plan above     Trigeminal Neuralgia   Recurrent paroxysms of unilateral facial pain in distribution of trigeminal nerve. Pain is severe, lasting few seconds to minutes, and electric shock-like quality. Precipitated by innocuous  Stimuli within the trigeminal area.   - MRI brain w w/o contrast  - Carbamezapine 400 mg BID  - Toradol 15 mg IV PRN for break through pain control    Hyponatremia  - NS at 100 mL/hr   - Continue to monitor Na    Type II Diabetes Mellitus  - LDSSI  - Hypoglycemia protocol      Code Status: Full Code  FEN: IVF: NS; DIET GENERAL;  PPX: Lovenox  DISPO: GMF      This patient will be discussed with attending, Lul Pitts MD.    Juan Garrison MD MD, PGY- 1  5/16/2021,  2:38 AM

## 2021-05-16 NOTE — ED NOTES
RN attempted to call report for third time. Nurse unable to take report.  Still in pt room     [facility]  05/15/21 7827

## 2021-05-16 NOTE — ED NOTES
RN attempted to call report. Nurse on 4S unable to take report at this time. Will try again.      Bre Mccrary RN  05/15/21 6165

## 2021-05-17 ENCOUNTER — APPOINTMENT (OUTPATIENT)
Dept: GENERAL RADIOLOGY | Age: 76
DRG: 177 | End: 2021-05-17
Payer: MEDICARE

## 2021-05-17 LAB
ALBUMIN SERPL-MCNC: 2.8 G/DL (ref 3.4–5)
ALP BLD-CCNC: 52 U/L (ref 40–129)
ALT SERPL-CCNC: 14 U/L (ref 10–40)
ANION GAP SERPL CALCULATED.3IONS-SCNC: 10 MMOL/L (ref 3–16)
AST SERPL-CCNC: 37 U/L (ref 15–37)
BASOPHILS ABSOLUTE: 0 K/UL (ref 0–0.2)
BASOPHILS RELATIVE PERCENT: 0.6 %
BILIRUB SERPL-MCNC: <0.2 MG/DL (ref 0–1)
BILIRUBIN DIRECT: <0.2 MG/DL (ref 0–0.3)
BILIRUBIN, INDIRECT: ABNORMAL MG/DL (ref 0–1)
BUN BLDV-MCNC: 9 MG/DL (ref 7–20)
CALCIUM SERPL-MCNC: 7.3 MG/DL (ref 8.3–10.6)
CHLORIDE BLD-SCNC: 100 MMOL/L (ref 99–110)
CO2: 23 MMOL/L (ref 21–32)
CREAT SERPL-MCNC: 0.5 MG/DL (ref 0.6–1.2)
EOSINOPHILS ABSOLUTE: 0 K/UL (ref 0–0.6)
EOSINOPHILS RELATIVE PERCENT: 0 %
ESTIMATED AVERAGE GLUCOSE: 111.2 MG/DL
GFR AFRICAN AMERICAN: >60
GFR NON-AFRICAN AMERICAN: >60
GLUCOSE BLD-MCNC: 103 MG/DL (ref 70–99)
GLUCOSE BLD-MCNC: 104 MG/DL (ref 70–99)
GLUCOSE BLD-MCNC: 109 MG/DL (ref 70–99)
GLUCOSE BLD-MCNC: 127 MG/DL (ref 70–99)
HBA1C MFR BLD: 5.5 %
HCT VFR BLD CALC: 34 % (ref 36–48)
HEMOGLOBIN: 11.8 G/DL (ref 12–16)
LYMPHOCYTES ABSOLUTE: 0.4 K/UL (ref 1–5.1)
LYMPHOCYTES RELATIVE PERCENT: 16.3 %
MCH RBC QN AUTO: 31.5 PG (ref 26–34)
MCHC RBC AUTO-ENTMCNC: 34.7 G/DL (ref 31–36)
MCV RBC AUTO: 91 FL (ref 80–100)
MONOCYTES ABSOLUTE: 0.2 K/UL (ref 0–1.3)
MONOCYTES RELATIVE PERCENT: 8.6 %
NEUTROPHILS ABSOLUTE: 1.8 K/UL (ref 1.7–7.7)
NEUTROPHILS RELATIVE PERCENT: 74.5 %
PDW BLD-RTO: 13.2 % (ref 12.4–15.4)
PERFORMED ON: ABNORMAL
PLATELET # BLD: 113 K/UL (ref 135–450)
PMV BLD AUTO: 9.5 FL (ref 5–10.5)
POTASSIUM REFLEX MAGNESIUM: 3.6 MMOL/L (ref 3.5–5.1)
RBC # BLD: 3.74 M/UL (ref 4–5.2)
SODIUM BLD-SCNC: 133 MMOL/L (ref 136–145)
TOTAL PROTEIN: 5.3 G/DL (ref 6.4–8.2)
WBC # BLD: 2.4 K/UL (ref 4–11)

## 2021-05-17 PROCEDURE — C9113 INJ PANTOPRAZOLE SODIUM, VIA: HCPCS | Performed by: STUDENT IN AN ORGANIZED HEALTH CARE EDUCATION/TRAINING PROGRAM

## 2021-05-17 PROCEDURE — 6360000002 HC RX W HCPCS: Performed by: STUDENT IN AN ORGANIZED HEALTH CARE EDUCATION/TRAINING PROGRAM

## 2021-05-17 PROCEDURE — 80048 BASIC METABOLIC PNL TOTAL CA: CPT

## 2021-05-17 PROCEDURE — 2060000000 HC ICU INTERMEDIATE R&B

## 2021-05-17 PROCEDURE — 36415 COLL VENOUS BLD VENIPUNCTURE: CPT

## 2021-05-17 PROCEDURE — 80076 HEPATIC FUNCTION PANEL: CPT

## 2021-05-17 PROCEDURE — 85025 COMPLETE CBC W/AUTO DIFF WBC: CPT

## 2021-05-17 PROCEDURE — 83036 HEMOGLOBIN GLYCOSYLATED A1C: CPT

## 2021-05-17 PROCEDURE — 6360000002 HC RX W HCPCS: Performed by: INTERNAL MEDICINE

## 2021-05-17 PROCEDURE — 2580000003 HC RX 258: Performed by: STUDENT IN AN ORGANIZED HEALTH CARE EDUCATION/TRAINING PROGRAM

## 2021-05-17 PROCEDURE — 71045 X-RAY EXAM CHEST 1 VIEW: CPT

## 2021-05-17 PROCEDURE — 2500000003 HC RX 250 WO HCPCS: Performed by: STUDENT IN AN ORGANIZED HEALTH CARE EDUCATION/TRAINING PROGRAM

## 2021-05-17 RX ORDER — PANTOPRAZOLE SODIUM 40 MG/1
40 TABLET, DELAYED RELEASE ORAL
Status: DISCONTINUED | OUTPATIENT
Start: 2021-05-18 | End: 2021-05-20 | Stop reason: HOSPADM

## 2021-05-17 RX ADMIN — ENOXAPARIN SODIUM 30 MG: 30 INJECTION SUBCUTANEOUS at 21:05

## 2021-05-17 RX ADMIN — REMDESIVIR 100 MG: 100 INJECTION, POWDER, LYOPHILIZED, FOR SOLUTION INTRAVENOUS at 00:55

## 2021-05-17 RX ADMIN — PANTOPRAZOLE SODIUM 40 MG: 40 INJECTION, POWDER, FOR SOLUTION INTRAVENOUS at 09:07

## 2021-05-17 RX ADMIN — Medication 10 ML: at 09:07

## 2021-05-17 RX ADMIN — DEXAMETHASONE SODIUM PHOSPHATE 6 MG: 4 INJECTION, SOLUTION INTRAMUSCULAR; INTRAVENOUS at 00:01

## 2021-05-17 RX ADMIN — Medication 10 ML: at 21:06

## 2021-05-17 RX ADMIN — ONDANSETRON 4 MG: 2 INJECTION INTRAMUSCULAR; INTRAVENOUS at 21:00

## 2021-05-17 RX ADMIN — ENOXAPARIN SODIUM 30 MG: 30 INJECTION SUBCUTANEOUS at 09:07

## 2021-05-17 ASSESSMENT — PAIN SCALES - GENERAL
PAINLEVEL_OUTOF10: 0

## 2021-05-17 NOTE — PROGRESS NOTES
Notification of IV to PO Conversion     IV To Po Conversion:   Will convert pantoprazole to PO based on [address] IV to PO policy (see below). Please call with questions.   Tylor Dawkins, PharmD, [address] [place] [address]: [phone]  [address] [place] [address]: [phone]  5/17/2021 9:33 AM      Criteria for conversion from IV to PO therapy per [address] IV to PO Protocol:   Patients should meet all of the following inclusion criteria and none of the exclusion criteria    Criteria to initiate medication route switch (Inclusion Criteria)  IV therapy for > 24 hours (antibiotics only)  Tolerating diet more advanced than clear liquids  Tolerating oral (PO) medications  Does not require vasopressor therapy for blood pressure support  No seizures for 72hrs (antiepileptic medications only)      Criteria indicating that the patient is NOT a candidate for IV to PO conversion (Exclusion Criteria)  Infections requiring IV therapy (ie: meningitis, endocarditis, osteomyelitis, pancreatitis)  Nausea and/or vomiting or severe diarrhea within past 24 hours  Has gastrectomy, ileus, gastric outlet or bowel obstruction, or malabsorption syndromes  Has significant, painful oral ulceration  TPN with an NPO order  Active GI bleed  Unable to swallow  Nothing by Mouth (NPO) status  Febrile in the last 24 hours- (antibiotics only)  Clinical deteriorating or unstable - (antibiotics only)  Pediatric patients and patients who are not euthyroid (not on oral levothyroxine/not stabilized on oral levothyroxine) - (Levothyroxine only)  Patients being treated for myxedema coma or during the organ donation process - (Levothyroxine only)    Other notes-   Patients may be given suppositories when available for the product being ordered if no contraindications exist (ie: rectal surgery or infection)   Oral solutions/suspensions may be considered for patients with a functioning enteral tube not on continuous suction (if medication is available in this formulation)

## 2021-05-17 NOTE — CARE COORDINATION
Case Management Assessment           Initial Evaluation                Date / Time of Evaluation: 5/17/2021 2:19 PM                 Assessment Completed by: Rico Rodriguez RN    Patient Name: Stephanie Dyson     YOB: 1945  Diagnosis: Pneumonia due to COVID-19 virus [U07.1, J12.82]     Date / Time: 5/15/2021  5:17 PM    Patient Admission Status: Inpatient    If patient is discharged prior to next notation, then this note serves as note for discharge by case management.      Current PCP: Cynthia Hughes MD  Clinic Patient: No    Chart Reviewed: Yes  Patient/ Family Interviewed: Yes    Initial assessment completed at bedside with: patient over the phone due to isolation    Hospitalization in the last 30 days: No    Emergency Contacts:  Extended Emergency Contact Information  Primary Emergency Contact: Oscar Hawthorne  Address: [address] [place] [address] Phone: [phone]  Relation: Spouse  Secondary Emergency Contact: Oscar Hawthorne  Address: [address] [place] [address] Phone: [phone]  Relation: Spouse    Advance Directives:   Code Status: [address]: Yes  Agent: spouse Janine Schuler Number: [phone]        Financial  Payor: Ritchie Jarrett / Plan: Malik Gusman ESSENTIAL/PLUS / Product Type: *No Product type* /     Pre-cert required for SNF: Yes    Pharmacy    [address] [phone] - F [phone]  [address] [address]  Phone: [phone] Fax: [address], [address] [phone] - F [phone]  [address] [place] [address] Road To [facility] [address] [facility] [address]  Phone: [phone] Fax: [phone] [facility] [address] [place] [address] [phone] [facility] [phone]  [address] [place] [address]  Phone: [phone] Fax: [phone]      Potential assistance Purchasing Medications:    Does Patient want to participate in local refill/ meds to beds program?:      Meds To Beds General Rules:  1. Can ONLY be done Monday- Friday between 8:30am-5pm  2. Prescription(s) must be in pharmacy by 3pm to be filled same day  3. Copy of patient's insurance/ prescription drug card and patient face sheet must be sent along with the prescription(s)  4. Cost of Rx cannot be added to hospital bill. If financial assistance is needed, please contact unit  or ;  or  CANNOT provide pharmacy voucher for patients co-pays  5. Patients can then  the prescription on their way out of the hospital at discharge, or pharmacy can deliver to the bedside if staff is available. (payment due at time of pick-up or delivery - cash, check, or card accepted)     Able to afford home medications/ co-pay costs: Yes    ADLS  Support Systems:      PT AM-PAC:   /24  OT AM-PAC:   /24    [place]: ranch  Steps: 1    Plans to RETURN to current housing: Yes  Barriers to RETURNING to current housing: none    [facility]valerie Kas[address]  Currently ACTIVE with [address]: No  Home Care Agency: Not Applicable          Durable Medical Equipment  DME Provider: n/a  Equipment: n/a      DISCHARGE PLAN:  Disposition: Home- No Services Needed    Transportation PLAN for discharge: family     Factors facilitating achievement of predicted outcomes: Family support, Motivated and Cooperative    Barriers to discharge: Medical complications    Additional Case Management Notes:  Patient is from home with spouse, independent pta, normally drives self. Patient is interested in [facility] at discharge if needed. Spouse to transport home at d/c.     The Plan for Transition of Care is related to the following treatment goals of Pneumonia due to COVID-19 virus [U07.1, J12.82]    The Patient and/or patient representative Fransisco Zuluaga and her family were provided with a choice of provider and agrees with the discharge plan Yes    Freedom of choice list was provided with basic dialogue that supports the patient's individualized plan of care/goals and shares the quality data associated with the providers.  Yes    Care Transition patient: Yes    Scotty Foster RN  [facility] [employer] [employer]  Case Management Department  Ph: [phone]   Fax: [phone]

## 2021-05-17 NOTE — PROGRESS NOTES
Internal Medicine  PGY 1  Progress note    CC: left ear pain     HistoryObtained From:  patient      Interval hx:  Yao Pichardo. Afeb, other vitals stable. SpO2 91 on 5 L, has dry cough but no dyspnea. CBC significant for pancytopenia which is improving SA. I feel this pancytopenia is from her systemic illness 2/2 to covid. Pt states she was tested for covid at some clinic in [facility] on 5/9 which was positive, I myself have not been able to see a positive result. She states she feels short of breath when she walks and has a dry cough. She states her cough and dyspnea started around the same time when she got tested. Day 2 of decadron/RDV. Her left sided facial pain much better today. She was started on carbamazepine for suspected trigeminal neuralgia however I held it given her leucocytopenia. No MRI evidence of vascular compression or other abnormality involving the trigeminal nerves. HISTORY OF PRESENT ILLNESS:  Ila Mccrary is a 77 y.o. female with PMH of presumed left-sided trigeminal neuralgia, fibromyalgia, osteoporosis, and recent positive COVID-19 result (5/8) who p/w left-sided ear pain. She describes the pain to initially start on the left side has a shocking electric pain that shoots across her face to her right ear. She states the pain has now deescalated to just the left side now. She states the pain is severe, 10/10, intermittent, lasting only a few seconds, and prevents her from sleeping. She has tried taking Fioricet and Lyrica but has minimal improvement of her symptoms. Patient was recently hospitalized for similar presentation except the pain was bilaterally on both ears. She was given IV Toradol, Depakote, Neurontin and IVF which seemed to help somewhat with her pain. She denies any tinnitus, fever, chills, rash, hearing loss, vision changes, jaw pain, chest pain or shortness of breath.      ED course:   Vitals: wnl  Exam: Tenderness on palpation of left pinna, normal TM & auditory canal; mild crackles in middle & lower lung fields  Labs: Hyponatremic 133, hypglycemia 153, WBC 2.1  Imaging: CXR - bilateral interstitial ground glass opacities   Interventions: IV Toradol  & Depacon, 1 liter of LR    Past Medical History:        Diagnosis Date    Bladder problem     Bleeding disorder ([address]     Fibromyalgia     Migraine     Osteoporosis     Recurrent UTI     Skin lesion of left leg 3/15/2012    Vertigo 10/23/2012       Past Surgical History:        Procedure Laterality Date    APPENDECTOMY  1970    BLADDER SUSPENSION      Dr. Fabian Victoria    Right lumpectomy    COLON SURGERY      benign large growth    HYSTERECTOMY      Partial    POLYPECTOMY      colon       Medications Prior to Admission:    Medications Prior to Admission: pregabalin (LYRICA) 25 MG capsule, Take 1 capsule by mouth 3 times daily for 90 days. Increase to 50 mg TID in 24 - 48 hours if no pain relief. butalbital-aspirin-caffeine (FIORINAL) -40 MG capsule, Take 1 capsule by mouth every 4 hours as needed for Headaches (ear ache.) for up to 24 doses. omeprazole (PRILOSEC) 20 MG delayed release capsule, Take 1 capsule by mouth Daily  ST ZHENG WORT PO, Take by mouth  Glucosamine-Chondroit-Vit C-Mn (GLUCOSAMINE CHONDR 500 COMPLEX PO), Take by mouth  vitamin D3 (CHOLECALCIFEROL) 400 units TABS tablet, Take 400 Units by mouth daily  ibuprofen (ADVIL;MOTRIN) 200 MG tablet, Take 200 mg by mouth every 6 hours as needed for Pain  Cranberry 405 MG CAPS, Take 1 capsule by mouth daily   calcium carbonate (OSCAL) 500 MG TABS tablet, Take 500 mg by mouth daily. b complex vitamins capsule, Take 1 capsule by mouth daily. [] azithromycin (ZITHROMAX) 250 MG tablet, Take 1 tablet by mouth See Admin Instructions for 5 days 500mg on day 1 followed by 250mg on days 2 - 5  ·   Allergies: Other    Social History:   · TOBACCO: reports that she has never smoked.  She has never used smokeless tobacco.  · ETOH:   reports no history of alcohol use. · DRUGS : no illicit drug use  · Patient currently lives with family. Family History:       Problem Relation Age of Onset   [facility] Cancer Mother         skin    Other Mother 80        DVT, PE    High Blood Pressure Father     Stroke Father     Heart Disease Father         MI    High Cholesterol Brother     High Blood Pressure Brother     Asthma Brother        Physical Exam  Constitutional:       Appearance: Normal appearance. HENT:      Head: Normocephalic and atraumatic. Left Ear: Hearing, tympanic membrane and ear canal normal. Tenderness present. Comments: Ecchymosis & wound on left pinna   Cardiovascular:      Rate and Rhythm: Normal rate and regular rhythm. Pulses: Normal pulses. Heart sounds: Normal heart sounds. Pulmonary:      Effort: Pulmonary effort is normal.      Breath sounds: Rales (bilateral lower lobes) present. Abdominal:      General: Bowel sounds are normal.      Palpations: Abdomen is soft. Skin:     General: Skin is warm and dry. Neurological:      General: No focal deficit present. Mental Status: She is alert and oriented to person, place, and time. Mental status is at baseline.                Vitals:    05/17/21 0815   BP: 112/64   Pulse: 60   Resp: 18   Temp: 98 °F (36.7 °C)   SpO2: 90%       DATA:    Labs:  BMP:   Recent Labs     05/15/21  1822 05/16/21  0451 05/17/21  0454   * 134* 133*   K 3.5 3.5 3.6   CL 98* 99 100   CO2 23 23 23   BUN 13 10 9   CREATININE 0.6 0.6 0.5*   GLUCOSE 153* 120* 127*     CBC:   Recent Labs     05/15/21  1822 05/16/21 0451 05/17/21  0454   WBC 2.1* 2.0* 2.4*   HGB 12.5 12.0 11.8*   HCT 36.2 34.1* 34.0*   * 91* 113*       LFT's:   Recent Labs     05/15/21  2355 05/17/21 0454   AST 38* 37   ALT 16 14   BILITOT <0.2 <0.2   ALKPHOS 57 52     Troponin:   Recent Labs     05/15/21  1822   TROPONINI <0.01     BNP: No results for input(s): BNP in the last 72 hours.  ABGs: No results for input(s): PHART, QVK1FJT, PO2ART in the last 72 hours. INR: No results for input(s): INR in the last 72 hours. U/A:No results for input(s): NITRITE, COLORU, PHUR, LABCAST, WBCUA, RBCUA, MUCUS, TRICHOMONAS, YEAST, BACTERIA, CLARITYU, SPECGRAV, LEUKOCYTESUR, UROBILINOGEN, BILIRUBINUR, BLOODU, GLUCOSEU, AMORPHOUS in the last 72 hours. Invalid input(s): KETONESU    MRI BRAIN W WO CONTRAST   Final Result      1. No acute intracranial abnormality. No MRI evidence of vascular compression or other abnormality involving the trigeminal nerves. 2.  Mild cerebral atrophy and mild chronic small vessel ischemic disease. XR CHEST PORTABLE   Final Result      Mild bilateral interstitial opacities and patchy left lung groundglass opacities. Recommend correlation for mild interstitial edema or viral pneumonia. ASSESSMENT AND PLAN:  Yina Hooker is a 77 y.o. female presents with left-sided ear pain and was admitted for acute hypoxic respiratory failure 2/2 COVID pneumonia. Acute Hypoxic Respiratory Failure 2/2 COVID PNA  - Supplemental oxygen, wean as tolerated; keep SpO2 >88%  - Decadron/Redemsivir day 2  - daily LFTs    COVID-19 PNA  Positive result on 5/8/21. Imaging revealing bilateral ground glass opacities. Lymphopenia & elevated inflammatory markers. - See treatment plan above     Left sided facial pain  Recurrent paroxysms of unilateral facial pain. . Pain is severe, lasting few seconds to minutes, and electric shock-like quality.  Precipitated by grinding teeth together  - MRI brain w w/o contrast: No MRI evidence of vascular compression or other abnormality involving the trigeminal nerves.   -suspicion for trigeminal neuralgia low  -hold  Carbamezapine   consider neuro consult    Hyponatremia (improving)  - NS at 100 mL/hr   - Continue to monitor Na        Code Status: Full Code  FEN: IVF: NS; DIET GENERAL;  PPX: Lovenox  DISPO: GMF      This patient will be discussed with attending, Ange Clark MD.    Destin Griffin MD MD, PGY- 1  5/17/2021,  9:33 AM

## 2021-05-17 NOTE — PROGRESS NOTES
Pt family member Rodger Castillo) contacted and updated on pt status and plan of care.  Electronically signed by Lian Birch RN on 5/17/2021 at 7:40 AM

## 2021-05-17 NOTE — FLOWSHEET NOTE
05/17/21 1435   Encounter Summary   Services provided to: Patient; Family   Continue Visiting   (es 5/17 acp)   Complexity of Encounter High   Length of Encounter 30 minutes   Advance Care Planning Yes   Routine   Type Initial   Assessment Angry;Coping   Intervention Active listening;Explored feelings, thoughts, concerns   Outcome Engaged in conversation;Venting emotion   acp conversation with both pt and  by phone. Details in acp note.   Pt's  was hoping that I would be the doctor calling, and felt frustrated that call was for acp and not for medical information

## 2021-05-17 NOTE — ACP (ADVANCE CARE PLANNING)
Advance Care Planning     Advance Care Planning Inpatient Note  Spiritual Care Department    Today's Date: 5/17/2021  Unit: [place] [id number] PCU    Received request from IDT Member. Upon review of chart and communication with care team, patient's decision making abilities are not in question. Patient and Spousewas/were present in the room during visit. Goals of ACP Conversation:  Discuss advance care planning documents    Health Care Decision Makers:      Click here to complete [address] including selection of the Healthcare Decision Maker Relationship (ie \"Primary\")     Today we:  Patient stated that she would like for her , Jimy Wynn, to be her HCPOA. She referred me to him to discuss advance directives. She said that she did not want to sign anything that didn't come from her .  said that they were in process of doing HCPOA paperwork prior to hospitalization, and will complete when able. Advance Care Planning Documents (Patient Wishes):  None     Assessment:  N/a     Interventions:  Discussed and provided education on state decision maker hierarchy.   Deferred conversation as patient not interested in completing an advance directive at this time    Outcomes/Plan:  Teach Back Method used to verify the patient's and/or Healthcare Decision Maker's understanding of key information in the advance directive documents    Electronically signed by Yaniv Kaiser, [address] on 5/17/2021 at 2:38 PM

## 2021-05-18 LAB
ALBUMIN SERPL-MCNC: 3 G/DL (ref 3.4–5)
ALP BLD-CCNC: 61 U/L (ref 40–129)
ALT SERPL-CCNC: 18 U/L (ref 10–40)
ANION GAP SERPL CALCULATED.3IONS-SCNC: 10 MMOL/L (ref 3–16)
AST SERPL-CCNC: 43 U/L (ref 15–37)
BASOPHILS ABSOLUTE: 0 K/UL (ref 0–0.2)
BASOPHILS RELATIVE PERCENT: 0.2 %
BILIRUB SERPL-MCNC: <0.2 MG/DL (ref 0–1)
BILIRUBIN DIRECT: <0.2 MG/DL (ref 0–0.3)
BILIRUBIN, INDIRECT: ABNORMAL MG/DL (ref 0–1)
BUN BLDV-MCNC: 9 MG/DL (ref 7–20)
CALCIUM SERPL-MCNC: 7.8 MG/DL (ref 8.3–10.6)
CHLORIDE BLD-SCNC: 102 MMOL/L (ref 99–110)
CO2: 23 MMOL/L (ref 21–32)
CREAT SERPL-MCNC: 0.6 MG/DL (ref 0.6–1.2)
EOSINOPHILS ABSOLUTE: 0 K/UL (ref 0–0.6)
EOSINOPHILS RELATIVE PERCENT: 0.1 %
GFR AFRICAN AMERICAN: >60
GFR NON-AFRICAN AMERICAN: >60
GLUCOSE BLD-MCNC: 111 MG/DL (ref 70–99)
GLUCOSE BLD-MCNC: 114 MG/DL (ref 70–99)
GLUCOSE BLD-MCNC: 124 MG/DL (ref 70–99)
GLUCOSE BLD-MCNC: 126 MG/DL (ref 70–99)
GLUCOSE BLD-MCNC: 95 MG/DL (ref 70–99)
HCT VFR BLD CALC: 37 % (ref 36–48)
HEMOGLOBIN: 12.9 G/DL (ref 12–16)
LYMPHOCYTES ABSOLUTE: 0.6 K/UL (ref 1–5.1)
LYMPHOCYTES RELATIVE PERCENT: 17.3 %
MAGNESIUM: 2 MG/DL (ref 1.8–2.4)
MCH RBC QN AUTO: 31.4 PG (ref 26–34)
MCHC RBC AUTO-ENTMCNC: 34.8 G/DL (ref 31–36)
MCV RBC AUTO: 90 FL (ref 80–100)
MONOCYTES ABSOLUTE: 0.3 K/UL (ref 0–1.3)
MONOCYTES RELATIVE PERCENT: 7.6 %
NEUTROPHILS ABSOLUTE: 2.7 K/UL (ref 1.7–7.7)
NEUTROPHILS RELATIVE PERCENT: 74.8 %
PDW BLD-RTO: 13.4 % (ref 12.4–15.4)
PERFORMED ON: ABNORMAL
PERFORMED ON: NORMAL
PHOSPHORUS: 3.1 MG/DL (ref 2.5–4.9)
PLATELET # BLD: 141 K/UL (ref 135–450)
PMV BLD AUTO: 9.2 FL (ref 5–10.5)
POTASSIUM SERPL-SCNC: 3.6 MMOL/L (ref 3.5–5.1)
RBC # BLD: 4.11 M/UL (ref 4–5.2)
SODIUM BLD-SCNC: 135 MMOL/L (ref 136–145)
TOTAL PROTEIN: 5.8 G/DL (ref 6.4–8.2)
WBC # BLD: 3.6 K/UL (ref 4–11)

## 2021-05-18 PROCEDURE — 36415 COLL VENOUS BLD VENIPUNCTURE: CPT

## 2021-05-18 PROCEDURE — 80069 RENAL FUNCTION PANEL: CPT

## 2021-05-18 PROCEDURE — 6370000000 HC RX 637 (ALT 250 FOR IP): Performed by: STUDENT IN AN ORGANIZED HEALTH CARE EDUCATION/TRAINING PROGRAM

## 2021-05-18 PROCEDURE — 83735 ASSAY OF MAGNESIUM: CPT

## 2021-05-18 PROCEDURE — 80076 HEPATIC FUNCTION PANEL: CPT

## 2021-05-18 PROCEDURE — 6360000002 HC RX W HCPCS: Performed by: STUDENT IN AN ORGANIZED HEALTH CARE EDUCATION/TRAINING PROGRAM

## 2021-05-18 PROCEDURE — 2500000003 HC RX 250 WO HCPCS: Performed by: STUDENT IN AN ORGANIZED HEALTH CARE EDUCATION/TRAINING PROGRAM

## 2021-05-18 PROCEDURE — 6360000002 HC RX W HCPCS: Performed by: INTERNAL MEDICINE

## 2021-05-18 PROCEDURE — 2580000003 HC RX 258: Performed by: STUDENT IN AN ORGANIZED HEALTH CARE EDUCATION/TRAINING PROGRAM

## 2021-05-18 PROCEDURE — 85025 COMPLETE CBC W/AUTO DIFF WBC: CPT

## 2021-05-18 PROCEDURE — 2060000000 HC ICU INTERMEDIATE R&B

## 2021-05-18 RX ORDER — PROCHLORPERAZINE EDISYLATE 5 MG/ML
10 INJECTION INTRAMUSCULAR; INTRAVENOUS EVERY 6 HOURS PRN
Status: DISCONTINUED | OUTPATIENT
Start: 2021-05-18 | End: 2021-05-20 | Stop reason: HOSPADM

## 2021-05-18 RX ADMIN — PROCHLORPERAZINE EDISYLATE 10 MG: 5 INJECTION INTRAMUSCULAR; INTRAVENOUS at 16:18

## 2021-05-18 RX ADMIN — REMDESIVIR 100 MG: 100 INJECTION, POWDER, LYOPHILIZED, FOR SOLUTION INTRAVENOUS at 23:21

## 2021-05-18 RX ADMIN — REMDESIVIR 100 MG: 100 INJECTION, POWDER, LYOPHILIZED, FOR SOLUTION INTRAVENOUS at 00:48

## 2021-05-18 RX ADMIN — ACETAMINOPHEN 650 MG: 325 TABLET ORAL at 00:48

## 2021-05-18 RX ADMIN — PANTOPRAZOLE SODIUM 40 MG: 40 TABLET, DELAYED RELEASE ORAL at 05:31

## 2021-05-18 RX ADMIN — Medication 10 ML: at 20:21

## 2021-05-18 RX ADMIN — ENOXAPARIN SODIUM 30 MG: 30 INJECTION SUBCUTANEOUS at 20:21

## 2021-05-18 RX ADMIN — ONDANSETRON 4 MG: 2 INJECTION INTRAMUSCULAR; INTRAVENOUS at 20:21

## 2021-05-18 RX ADMIN — SODIUM CHLORIDE: 9 INJECTION, SOLUTION INTRAVENOUS at 22:27

## 2021-05-18 RX ADMIN — Medication 10 ML: at 08:29

## 2021-05-18 RX ADMIN — ENOXAPARIN SODIUM 30 MG: 30 INJECTION SUBCUTANEOUS at 08:29

## 2021-05-18 RX ADMIN — DEXAMETHASONE SODIUM PHOSPHATE 6 MG: 4 INJECTION, SOLUTION INTRAMUSCULAR; INTRAVENOUS at 22:27

## 2021-05-18 RX ADMIN — DEXAMETHASONE SODIUM PHOSPHATE 6 MG: 4 INJECTION, SOLUTION INTRAMUSCULAR; INTRAVENOUS at 00:03

## 2021-05-18 ASSESSMENT — PAIN DESCRIPTION - ORIENTATION: ORIENTATION: ANTERIOR

## 2021-05-18 ASSESSMENT — PAIN DESCRIPTION - LOCATION: LOCATION: HEAD

## 2021-05-18 ASSESSMENT — PAIN SCALES - GENERAL
PAINLEVEL_OUTOF10: 0
PAINLEVEL_OUTOF10: 2

## 2021-05-18 ASSESSMENT — PAIN DESCRIPTION - DESCRIPTORS: DESCRIPTORS: HEADACHE

## 2021-05-18 ASSESSMENT — PAIN DESCRIPTION - PAIN TYPE: TYPE: ACUTE PAIN

## 2021-05-18 ASSESSMENT — PAIN DESCRIPTION - PROGRESSION: CLINICAL_PROGRESSION: GRADUALLY WORSENING

## 2021-05-18 ASSESSMENT — PAIN DESCRIPTION - FREQUENCY: FREQUENCY: CONTINUOUS

## 2021-05-18 ASSESSMENT — PAIN DESCRIPTION - ONSET: ONSET: ON-GOING

## 2021-05-18 ASSESSMENT — PAIN - FUNCTIONAL ASSESSMENT: PAIN_FUNCTIONAL_ASSESSMENT: ACTIVITIES ARE NOT PREVENTED

## 2021-05-18 NOTE — PLAN OF CARE
Problem: Gas Exchange - Impaired  Goal: Absence of hypoxia  Outcome: Ongoing   Pt has remained on 5L NC with sats >90%. Pt denies SOB. Pt has labored breathing with exertion. Problem: Body Temperature -  Risk of, Imbalanced  Goal: Ability to maintain a body temperature within defined limits  Outcome: Ongoing   Pt has remained afebrile and denies chills. Problem: Falls - Risk of:  Goal: Will remain free from falls  Description: Will remain free from falls  Outcome: Ongoing   Pt has remained free of falls throughout the night. Pt is SBA.

## 2021-05-18 NOTE — CARE COORDINATION
CM following for discharge planning. Pt is from home with spouse, Independent prior to admission. No DME, No HHC. Pt is COVID-19 POSITIVE, currently on 5 L O2 none at home. Day 3/10 of  IV Decadron and day 2/5 IV Remdesivir. Pt also complaining of left sided head/ear pain, controlled by pain medication, MRI negative. Pt is agreeable to [address] as needed. Continuing Decadron, Remdesivir and weaning O2. CM to continue to follow.     Roel Pascual RN, BSN, [address]  Case Management Department  [phone]

## 2021-05-18 NOTE — PROGRESS NOTES
Maryam Head contacted and updated on pt status and plan of care.  Electronically signed by Mervat Edwards RN on 5/18/2021 at 8:09 AM

## 2021-05-18 NOTE — PLAN OF CARE
Problem: Isolation Precautions - Risk of Spread of Infection  Goal: Prevent transmission of infection  5/18/2021 0904 by Warren Xiong RN  Outcome: Ongoing    Patient remains in droplet plus isolation for COVID19 infection.

## 2021-05-18 NOTE — PLAN OF CARE
Problem: Isolation Precautions - Risk of Spread of Infection  Goal: Prevent transmission of infection  Outcome: Ongoing   Patient remains in droplet plus isolation for COVID19 infection.

## 2021-05-18 NOTE — PROGRESS NOTES
Internal Medicine  PGY 1  Progress note    CC: left ear pain     HistoryObtained From:  patient      Interval hx:  Luciana Chavarria. Afeb, other vitals stable. SpO2 93 on 5 L, has dry cough but no dyspnea. CBC significant for pancytopenia which is improving SA. I feel this pancytopenia is from her systemic illness 2/2 to covid. She states her cough and dyspnea started around the same time when she got tested. Day 3 of decadron/RDV. Her left sided facial pain much better today after tylenol. No MRI evidence of vascular compression or other abnormality involving the trigeminal nerves. HISTORY OF PRESENT ILLNESS:  Lucero Tanner is a 77 y.o. female with PMH of presumed left-sided trigeminal neuralgia, fibromyalgia, osteoporosis, and recent positive COVID-19 result (5/8) who p/w left-sided ear pain. She describes the pain to initially start on the left side has a shocking electric pain that shoots across her face to her right ear. She states the pain has now deescalated to just the left side now. She states the pain is severe, 10/10, intermittent, lasting only a few seconds, and prevents her from sleeping. She has tried taking Fioricet and Lyrica but has minimal improvement of her symptoms. Patient was recently hospitalized for similar presentation except the pain was bilaterally on both ears. She was given IV Toradol, Depakote, Neurontin and IVF which seemed to help somewhat with her pain. She denies any tinnitus, fever, chills, rash, hearing loss, vision changes, jaw pain, chest pain or shortness of breath.      ED course:   Vitals: wnl  Exam: Tenderness on palpation of left pinna, normal TM & auditory canal; mild crackles in middle & lower lung fields  Labs: Hyponatremic 133, hypglycemia 153, WBC 2.1  Imaging: CXR - bilateral interstitial ground glass opacities   Interventions: IV Toradol  & Depacon, 1 liter of LR    Past Medical History:        Diagnosis Date    Bladder problem     [facility] [address] [address]     Fibromyalgia     Migraine     Osteoporosis     Recurrent UTI     Skin lesion of left leg 3/15/2012    Vertigo 10/23/2012       Past Surgical History:        Procedure Laterality Date    APPENDECTOMY  1970    BLADDER SUSPENSION      Dr. Frankey Side    [facility]    benign large growth    HYSTERECTOMY      Partial    POLYPECTOMY      colon       Medications Prior to Admission:    Medications Prior to Admission: pregabalin (LYRICA) 25 MG capsule, Take 1 capsule by mouth 3 times daily for 90 days. Increase to 50 mg TID in 24 - 48 hours if no pain relief. butalbital-aspirin-caffeine (FIORINAL) -40 MG capsule, Take 1 capsule by mouth every 4 hours as needed for Headaches (ear ache.) for up to 24 doses. omeprazole (PRILOSEC) 20 MG delayed release capsule, Take 1 capsule by mouth Daily  ST ZHENG WORT PO, Take by mouth  Glucosamine-Chondroit-Vit C-Mn (GLUCOSAMINE CHONDR 500 COMPLEX PO), Take by mouth  vitamin D3 (CHOLECALCIFEROL) 400 units TABS tablet, Take 400 Units by mouth daily  ibuprofen (ADVIL;MOTRIN) 200 MG tablet, Take 200 mg by mouth every 6 hours as needed for Pain  Cranberry 405 MG CAPS, Take 1 capsule by mouth daily   calcium carbonate (OSCAL) 500 MG TABS tablet, Take 500 mg by mouth daily. b complex vitamins capsule, Take 1 capsule by mouth daily. [] azithromycin (ZITHROMAX) 250 MG tablet, Take 1 tablet by mouth See Admin Instructions for 5 days 500mg on day 1 followed by 250mg on days 2 - 5  ·   Allergies: Other    Social History:   · TOBACCO: reports that she has never smoked. She has never used smokeless tobacco.  · ETOH:   reports no history of alcohol use. · DRUGS : no illicit drug use  · Patient currently lives with family.     Family History:       Problem Relation Age of Onset    Cancer Mother         skin    Other Mother 80        DVT, PE    High Blood Pressure Father     Stroke Father     Heart Disease Father MI    High Cholesterol Brother     High Blood Pressure Brother     Asthma Brother        Physical Exam  Constitutional:       Appearance: Normal appearance. HENT:      Head: Normocephalic and atraumatic. Left Ear: Hearing, tympanic membrane and ear canal normal. Tenderness present. Comments: Ecchymosis & wound on left pinna   Cardiovascular:      Rate and Rhythm: Normal rate and regular rhythm. Pulses: Normal pulses. Heart sounds: Normal heart sounds. Pulmonary:      Effort: Pulmonary effort is normal.      Breath sounds: Rales (bilateral lower lobes) present. Abdominal:      General: Bowel sounds are normal.      Palpations: Abdomen is soft. Skin:     General: Skin is warm and dry. Neurological:      General: No focal deficit present. Mental Status: She is alert and oriented to person, place, and time. Mental status is at baseline. Vitals:    05/18/21 0831   BP: 134/80   Pulse: 70   Resp: 18   Temp: 97.9 °F (36.6 °C)   SpO2: 92%       DATA:    Labs:  BMP:   Recent Labs     05/16/21 0451 05/17/21 0454 05/18/21  0534   * 133* 135*   K 3.5 3.6 3.6   CL 99 100 102   CO2 23 23 23   BUN 10 9 9   CREATININE 0.6 0.5* 0.6   GLUCOSE 120* 127* 126*     CBC:   Recent Labs     05/16/21 0451 05/17/21 0454 05/18/21  0534   WBC 2.0* 2.4* 3.6*   HGB 12.0 11.8* 12.9   HCT 34.1* 34.0* 37.0   PLT 91* 113* 141       LFT's:   Recent Labs     05/15/21  2355 05/17/21 0454 05/18/21  0534   AST 38* 37 43*   ALT 16 14 18   BILITOT <0.2 <0.2 <0.2   ALKPHOS 57 52 61     Troponin:   Recent Labs     05/15/21  1822   TROPONINI <0.01     BNP: No results for input(s): BNP in the last 72 hours. ABGs: No results for input(s): PHART, LNK0TMQ, PO2ART in the last 72 hours. INR: No results for input(s): INR in the last 72 hours.     U/A:No results for input(s): NITRITE, COLORU, PHUR, LABCAST, WBCUA, RBCUA, MUCUS, TRICHOMONAS, YEAST, BACTERIA, CLARITYU, SPECGRAV, LEUKOCYTESUR, UROBILINOGEN, BILIRUBINUR, BLOODU, GLUCOSEU, AMORPHOUS in the last 72 hours. Invalid input(s): KETONESU    XR CHEST PORTABLE   Final Result   Impression: Interval increase in diffuse bilateral pulmonary opacities. MRI BRAIN W WO CONTRAST   Final Result      1. No acute intracranial abnormality. No MRI evidence of vascular compression or other abnormality involving the trigeminal nerves. 2.  Mild cerebral atrophy and mild chronic small vessel ischemic disease. XR CHEST PORTABLE   Final Result      Mild bilateral interstitial opacities and patchy left lung groundglass opacities. Recommend correlation for mild interstitial edema or viral pneumonia. ASSESSMENT AND PLAN:  David Ross is a 77 y.o. female presents with left-sided ear pain and was admitted for acute hypoxic respiratory failure 2/2 COVID pneumonia. Acute Hypoxic Respiratory Failure 2/2 COVID PNA  - Supplemental oxygen, wean as tolerated; keep SpO2 >88%  - Decadron/Redemsivir day 3  - daily LFTs    COVID-19 PNA  Positive result on 5/8/21. Imaging revealing bilateral ground glass opacities. Lymphopenia & elevated inflammatory markers. - See treatment plan above     Left sided facial pain  Recurrent paroxysms of unilateral facial pain. . Pain is severe, lasting few seconds to minutes, and electric shock-like quality. Precipitated by grinding teeth together  - MRI brain w w/o contrast: No MRI evidence of vascular compression or other abnormality involving the trigeminal nerves.    -ESR wnl  -suspicion for temporal arteritis  trigeminal neuralgia low    Hyponatremia (improving)  - NS at 100 mL/hr   - Continue to monitor Na        Code Status: Full Code  FEN: IVF: NS; DIET GENERAL;  PPX: Lovenox  DISPO: GMF      This patient will be discussed with attending, Paula Obregon MD.    Enedelia Marcial MD MD, PGY- 1  5/18/2021,  9:51 AM

## 2021-05-18 NOTE — PLAN OF CARE
Problem: Pain:  Goal: Pain level will decrease  Description: Pain level will decrease  5/18/2021 0937 by Melissa Frias RN  Note: Patient is no longer complaining of pain on the left side of head/ear. Pain was relieved by oral medications. No complaints. Will continue to monitor. 5/18/2021 0936 by Melissa Frias RN  Outcome: Ongoing  5/18/2021 0757 by Los Bermudez RN  Outcome: Ongoing  Goal: Control of acute pain  Description: Control of acute pain  5/18/2021 0936 by Melissa Frias RN  Outcome: Ongoing  5/18/2021 0757 by Los Bermudez RN  Outcome: Ongoing  Goal: Control of chronic pain  Description: Control of chronic pain  5/18/2021 0936 by Melissa Frias RN  Outcome: Ongoing  5/18/2021 0757 by Los Bermudez RN  Outcome: Ongoing     Problem: Gas Exchange - Impaired  Goal: Absence of hypoxia  5/18/2021 0936 by Melissa Frias RN  Outcome: Ongoing  5/18/2021 0757 by Los Bermudez RN  Outcome: Ongoing  Goal: Promote optimal lung function  5/18/2021 0937 by Melissa Frias RN  Note: Patient was educated and instructed on the use and benefits on using the incentive spirometer. Patient using appropriately every hour.    5/18/2021 0936 by Melissa Frias RN  Outcome: Ongoing  5/18/2021 0757 by Los Bermudez RN  Outcome: Ongoing

## 2021-05-19 ENCOUNTER — APPOINTMENT (OUTPATIENT)
Dept: GENERAL RADIOLOGY | Age: 76
DRG: 177 | End: 2021-05-19
Payer: MEDICARE

## 2021-05-19 LAB
ALBUMIN SERPL-MCNC: 2.9 G/DL (ref 3.4–5)
ALP BLD-CCNC: 63 U/L (ref 40–129)
ALT SERPL-CCNC: 15 U/L (ref 10–40)
ANION GAP SERPL CALCULATED.3IONS-SCNC: 11 MMOL/L (ref 3–16)
AST SERPL-CCNC: 35 U/L (ref 15–37)
BASOPHILS ABSOLUTE: 0 K/UL (ref 0–0.2)
BASOPHILS RELATIVE PERCENT: 0.1 %
BILIRUB SERPL-MCNC: <0.2 MG/DL (ref 0–1)
BILIRUBIN DIRECT: <0.2 MG/DL (ref 0–0.3)
BILIRUBIN, INDIRECT: ABNORMAL MG/DL (ref 0–1)
BUN BLDV-MCNC: 8 MG/DL (ref 7–20)
CALCIUM SERPL-MCNC: 7.6 MG/DL (ref 8.3–10.6)
CHLORIDE BLD-SCNC: 103 MMOL/L (ref 99–110)
CO2: 24 MMOL/L (ref 21–32)
CREAT SERPL-MCNC: <0.5 MG/DL (ref 0.6–1.2)
EOSINOPHILS ABSOLUTE: 0 K/UL (ref 0–0.6)
EOSINOPHILS RELATIVE PERCENT: 0.1 %
GFR AFRICAN AMERICAN: >60
GFR NON-AFRICAN AMERICAN: >60
GLUCOSE BLD-MCNC: 100 MG/DL (ref 70–99)
GLUCOSE BLD-MCNC: 101 MG/DL (ref 70–99)
GLUCOSE BLD-MCNC: 106 MG/DL (ref 70–99)
GLUCOSE BLD-MCNC: 126 MG/DL (ref 70–99)
GLUCOSE BLD-MCNC: 144 MG/DL (ref 70–99)
HCT VFR BLD CALC: 33.2 % (ref 36–48)
HEMOGLOBIN: 11.8 G/DL (ref 12–16)
LYMPHOCYTES ABSOLUTE: 0.4 K/UL (ref 1–5.1)
LYMPHOCYTES RELATIVE PERCENT: 13.9 %
MAGNESIUM: 1.9 MG/DL (ref 1.8–2.4)
MCH RBC QN AUTO: 31.9 PG (ref 26–34)
MCHC RBC AUTO-ENTMCNC: 35.4 G/DL (ref 31–36)
MCV RBC AUTO: 90 FL (ref 80–100)
MONOCYTES ABSOLUTE: 0.2 K/UL (ref 0–1.3)
MONOCYTES RELATIVE PERCENT: 7.9 %
NEUTROPHILS ABSOLUTE: 2.3 K/UL (ref 1.7–7.7)
NEUTROPHILS RELATIVE PERCENT: 78 %
PDW BLD-RTO: 13.1 % (ref 12.4–15.4)
PERFORMED ON: ABNORMAL
PHOSPHORUS: 2.9 MG/DL (ref 2.5–4.9)
PLATELET # BLD: 153 K/UL (ref 135–450)
PMV BLD AUTO: 8.8 FL (ref 5–10.5)
POTASSIUM SERPL-SCNC: 3.3 MMOL/L (ref 3.5–5.1)
PROCALCITONIN: 0.08 NG/ML (ref 0–0.15)
RBC # BLD: 3.69 M/UL (ref 4–5.2)
SODIUM BLD-SCNC: 138 MMOL/L (ref 136–145)
TOTAL PROTEIN: 5.1 G/DL (ref 6.4–8.2)
WBC # BLD: 2.9 K/UL (ref 4–11)

## 2021-05-19 PROCEDURE — 2500000003 HC RX 250 WO HCPCS: Performed by: STUDENT IN AN ORGANIZED HEALTH CARE EDUCATION/TRAINING PROGRAM

## 2021-05-19 PROCEDURE — 6360000002 HC RX W HCPCS: Performed by: INTERNAL MEDICINE

## 2021-05-19 PROCEDURE — 80076 HEPATIC FUNCTION PANEL: CPT

## 2021-05-19 PROCEDURE — 6370000000 HC RX 637 (ALT 250 FOR IP): Performed by: STUDENT IN AN ORGANIZED HEALTH CARE EDUCATION/TRAINING PROGRAM

## 2021-05-19 PROCEDURE — 2580000003 HC RX 258: Performed by: STUDENT IN AN ORGANIZED HEALTH CARE EDUCATION/TRAINING PROGRAM

## 2021-05-19 PROCEDURE — 6360000002 HC RX W HCPCS: Performed by: STUDENT IN AN ORGANIZED HEALTH CARE EDUCATION/TRAINING PROGRAM

## 2021-05-19 PROCEDURE — 84145 PROCALCITONIN (PCT): CPT

## 2021-05-19 PROCEDURE — 83735 ASSAY OF MAGNESIUM: CPT

## 2021-05-19 PROCEDURE — 36415 COLL VENOUS BLD VENIPUNCTURE: CPT

## 2021-05-19 PROCEDURE — 85025 COMPLETE CBC W/AUTO DIFF WBC: CPT

## 2021-05-19 PROCEDURE — 2060000000 HC ICU INTERMEDIATE R&B

## 2021-05-19 PROCEDURE — 71045 X-RAY EXAM CHEST 1 VIEW: CPT

## 2021-05-19 PROCEDURE — 80069 RENAL FUNCTION PANEL: CPT

## 2021-05-19 RX ORDER — POTASSIUM CHLORIDE 20 MEQ/1
40 TABLET, EXTENDED RELEASE ORAL ONCE
Status: COMPLETED | OUTPATIENT
Start: 2021-05-19 | End: 2021-05-19

## 2021-05-19 RX ADMIN — DEXAMETHASONE SODIUM PHOSPHATE 6 MG: 4 INJECTION, SOLUTION INTRAMUSCULAR; INTRAVENOUS at 23:40

## 2021-05-19 RX ADMIN — REMDESIVIR 100 MG: 100 INJECTION, POWDER, LYOPHILIZED, FOR SOLUTION INTRAVENOUS at 23:40

## 2021-05-19 RX ADMIN — POTASSIUM CHLORIDE 40 MEQ: 1500 TABLET, EXTENDED RELEASE ORAL at 08:18

## 2021-05-19 RX ADMIN — PANTOPRAZOLE SODIUM 40 MG: 40 TABLET, DELAYED RELEASE ORAL at 08:19

## 2021-05-19 RX ADMIN — Medication 10 ML: at 20:36

## 2021-05-19 RX ADMIN — SODIUM CHLORIDE: 9 INJECTION, SOLUTION INTRAVENOUS at 18:30

## 2021-05-19 RX ADMIN — ENOXAPARIN SODIUM 30 MG: 30 INJECTION SUBCUTANEOUS at 20:35

## 2021-05-19 RX ADMIN — ONDANSETRON 4 MG: 2 INJECTION INTRAMUSCULAR; INTRAVENOUS at 08:18

## 2021-05-19 RX ADMIN — SODIUM CHLORIDE: 9 INJECTION, SOLUTION INTRAVENOUS at 08:25

## 2021-05-19 RX ADMIN — PROCHLORPERAZINE EDISYLATE 10 MG: 5 INJECTION INTRAMUSCULAR; INTRAVENOUS at 13:11

## 2021-05-19 RX ADMIN — ONDANSETRON 4 MG: 2 INJECTION INTRAMUSCULAR; INTRAVENOUS at 20:35

## 2021-05-19 RX ADMIN — Medication 10 ML: at 08:19

## 2021-05-19 RX ADMIN — ENOXAPARIN SODIUM 30 MG: 30 INJECTION SUBCUTANEOUS at 08:18

## 2021-05-19 ASSESSMENT — PAIN SCALES - GENERAL
PAINLEVEL_OUTOF10: 0

## 2021-05-19 NOTE — PLAN OF CARE
Problem: Airway Clearance - Ineffective  Goal: Achieve or maintain patent airway  Outcome: Ongoing  Note: Pt is currently on 5L per NC. Pt has oxygen saturations >90%. Pt has diminished lung sounds. Pt instructed to use IS as well as coughing and deep breathing to facilitate ventilation. Problem: Gas Exchange - Impaired  Goal: Absence of hypoxia  5/19/2021 1042 by Oksana Ann RN  Outcome: Ongoing     Problem: Gas Exchange - Impaired  Goal: Promote optimal lung function  Outcome: Ongoing     Problem: Isolation Precautions - Risk of Spread of Infection  Goal: Prevent transmission of infection  5/19/2021 1042 by Oksana Ann RN  Outcome: Ongoing  Note: Pt educated on the necessity for isolation due to COVID-19 infection. Pt is aware that staff have to be in isolation garb as well as room door must remain closed. Problem: Nutrition Deficits  Goal: Optimize nutritional status  5/19/2021 1042 by Oksana Ann RN  Outcome: Ongoing  Note: Pt given FANS number and encouraged to call for meals. Pt encouraged to make attempts to eat and drink. Problem: Falls - Risk of:  Goal: Will remain free from falls  Description: Will remain free from falls  Outcome: Ongoing  Note: Pt remains a medium fall risk. Pt is a SBA. Pt has bed alarm in place, non skid socks on, call light and personal belongings within reach. Pt educated on the importance of calling out for assistance. Pt is on oxygen and continuous IV fluids and does need help with devices.

## 2021-05-19 NOTE — PLAN OF CARE
Problem: Gas Exchange - Impaired  Goal: Absence of hypoxia  Outcome: Ongoing  Note: Pt on 5L NC, tolerating well     Problem: Breathing Pattern - Ineffective  Goal: Ability to achieve and maintain a regular respiratory rate  Outcome: Ongoing  Note: Dyspnea w/ exertion, unlabored brathing at rest     Problem: Body Temperature -  Risk of, Imbalanced  Goal: Ability to maintain a body temperature within defined limits  Outcome: Ongoing  Note: No fever at this time     Problem: Isolation Precautions - Risk of Spread of Infection  Goal: Prevent transmission of infection  Outcome: Ongoing  Note: Covid 19 isolation - droplet plus isolation in place     Problem: Nutrition Deficits  Goal: Optimize nutritional status  Outcome: Ongoing  Note: Pt unable to consume greater than 25% of meal due to nausea     Problem: Fatigue  Goal: Verbalize increase energy and improved vitality  Outcome: Ongoing  Note: Pt reports feeling fatigued, resting at this time.

## 2021-05-19 NOTE — CARE COORDINATION
CM following for discharge planning. Pt is from home with spouse, Independent prior to admission. No DME, No HHC. Pt is COVID-19 POSITIVE, currently on 5 L O2 none at home. Day 4/10 of  IV Decadron and day 3/5 IV Remdesivir. Pt is agreeable to [facility] as needed. Continuing Decadron, Remdesivir and weaning O2. CM to continue to follow.     Tino Coates RN, BSN, [address]  Case Management Department  [phone]

## 2021-05-19 NOTE — PROGRESS NOTES
Internal Medicine  PGY 1  Progress note    CC: left ear pain     HistoryObtained From:  patient      Interval hx:  Levell Corolla. Afeb, other vitals stable. SpO2 93-94 on 5 L, she states her cough is decreasing infrequency, she still has no dyspnea on rest . She state hat she has discomfort in her entire chest in taking a deep breath. CBC significant for pancytopenia which is stable and is improving SA. I feel this pancytopenia is from her systemic illness 2/2 to covid. Day 4 of decadron/RDV. Hypokalemia/Hypomagnesemia 2/2 to decreased intake from nausea    HISTORY OF PRESENT ILLNESS:  Cara Garrido is a 77 y.o. female with PMH of presumed left-sided trigeminal neuralgia, fibromyalgia, osteoporosis, and recent positive COVID-19 result (5/8) who p/w left-sided ear pain. She describes the pain to initially start on the left side has a shocking electric pain that shoots across her face to her right ear. She states the pain has now deescalated to just the left side now. She states the pain is severe, 10/10, intermittent, lasting only a few seconds, and prevents her from sleeping. She has tried taking Fioricet and Lyrica but has minimal improvement of her symptoms. Patient was recently hospitalized for similar presentation except the pain was bilaterally on both ears. She was given IV Toradol, Depakote, Neurontin and IVF which seemed to help somewhat with her pain. She denies any tinnitus, fever, chills, rash, hearing loss, vision changes, jaw pain, chest pain or shortness of breath.      ED course:   Vitals: wnl  Exam: Tenderness on palpation of left pinna, normal TM & auditory canal; mild crackles in middle & lower lung fields  Labs: Hyponatremic 133, hypglycemia 153, WBC 2.1  Imaging: CXR - bilateral interstitial ground glass opacities   Interventions: IV Toradol  & Depacon, 1 liter of LR    Past Medical History:        Diagnosis Date    Bladder problem     Bleeding disorder (HCC)     Fibromyalgia     Migraine     Osteoporosis     Recurrent UTI     Skin lesion of left leg 3/15/2012    Vertigo 10/23/2012       Past Surgical History:        Procedure Laterality Date    APPENDECTOMY  1970    BLADDER SUSPENSION      Dr. Andrzej Zhao    [facility]    benign large growth    HYSTERECTOMY      Partial    POLYPECTOMY      colon       Medications Prior to Admission:    Medications Prior to Admission: pregabalin (LYRICA) 25 MG capsule, Take 1 capsule by mouth 3 times daily for 90 days. Increase to 50 mg TID in 24 - 48 hours if no pain relief. butalbital-aspirin-caffeine (FIORINAL) -40 MG capsule, Take 1 capsule by mouth every 4 hours as needed for Headaches (ear ache.) for up to 24 doses. omeprazole (PRILOSEC) 20 MG delayed release capsule, Take 1 capsule by mouth Daily  ST ZHENG WORT PO, Take by mouth  Glucosamine-Chondroit-Vit C-Mn (GLUCOSAMINE CHONDR 500 COMPLEX PO), Take by mouth  vitamin D3 (CHOLECALCIFEROL) 400 units TABS tablet, Take 400 Units by mouth daily  ibuprofen (ADVIL;MOTRIN) 200 MG tablet, Take 200 mg by mouth every 6 hours as needed for Pain  Cranberry 405 MG CAPS, Take 1 capsule by mouth daily   calcium carbonate (OSCAL) 500 MG TABS tablet, Take 500 mg by mouth daily. b complex vitamins capsule, Take 1 capsule by mouth daily. [] azithromycin (ZITHROMAX) 250 MG tablet, Take 1 tablet by mouth See Admin Instructions for 5 days 500mg on day 1 followed by 250mg on days 2 - 5  ·   Allergies: Other    Social History:   · TOBACCO: reports that she has never smoked. She has never used smokeless tobacco.  · ETOH:   reports no history of alcohol use. · DRUGS : no illicit drug use  · Patient currently lives with family.     Family History:       Problem Relation Age of Onset   [employer] Cancer Mother         skin    Other Mother 80        DVT, PE    High Blood Pressure Father     Stroke Father     Heart Disease Father         MI    High Cholesterol Brother     High Blood Pressure Brother     Asthma Brother        Physical Exam  Constitutional:       Appearance: Normal appearance. HENT:      Head: Normocephalic and atraumatic. Cardiovascular:      Rate and Rhythm: Normal rate and regular rhythm. Pulses: Normal pulses. Heart sounds: Normal heart sounds. Pulmonary:      Effort: Pulmonary effort is normal.      Breath sounds: normal, has pain all over her chest on deep inspiration    Abdominal:      Palpations: Abdomen is soft. Non tender  Skin:     General: Skin is warm and dry. Neurological:      General: No focal deficit present. Mental Status: She is alert and oriented to person, place, and time. Mental status is at baseline. Vitals:    05/19/21 0755   BP: 129/82   Pulse: 65   Resp: 18   Temp: 97.5 °F (36.4 °C)   SpO2: (!) 89%       DATA:    Labs:  BMP:   Recent Labs     05/17/21 0454 05/18/21  0534 05/19/21  0450   * 135* 138   K 3.6 3.6 3.3*    102 103   CO2 23 23 24   BUN 9 9 8   CREATININE 0.5* 0.6 <0.5*   GLUCOSE 127* 126* 126*     CBC:   Recent Labs     05/17/21 0454 05/18/21  0534 05/19/21  0450   WBC 2.4* 3.6* 2.9*   HGB 11.8* 12.9 11.8*   HCT 34.0* 37.0 33.2*   * 141 153       LFT's:   Recent Labs     05/17/21 0454 05/18/21  0534 05/19/21  0450   AST 37 43* 35   ALT 14 18 15   BILITOT <0.2 <0.2 <0.2   ALKPHOS 52 61 63     Troponin:   No results for input(s): TROPONINI in the last 72 hours. BNP: No results for input(s): BNP in the last 72 hours. ABGs: No results for input(s): PHART, TFH7CLZ, PO2ART in the last 72 hours. INR: No results for input(s): INR in the last 72 hours. U/A:No results for input(s): NITRITE, COLORU, PHUR, LABCAST, WBCUA, RBCUA, MUCUS, TRICHOMONAS, YEAST, BACTERIA, CLARITYU, SPECGRAV, LEUKOCYTESUR, UROBILINOGEN, BILIRUBINUR, BLOODU, GLUCOSEU, AMORPHOUS in the last 72 hours.     Invalid input(s): KETONESU    XR CHEST PORTABLE   Final Result   Impression: Interval increase in diffuse bilateral pulmonary opacities. MRI BRAIN W WO CONTRAST   Final Result      1. No acute intracranial abnormality. No MRI evidence of vascular compression or other abnormality involving the trigeminal nerves. 2.  Mild cerebral atrophy and mild chronic small vessel ischemic disease. XR CHEST PORTABLE   Final Result      Mild bilateral interstitial opacities and patchy left lung groundglass opacities. Recommend correlation for mild interstitial edema or viral pneumonia. ASSESSMENT AND PLAN:  Jeromy Gan is a 77 y.o. female presents with left-sided ear pain and was admitted for acute hypoxic respiratory failure 2/2 COVID pneumonia. Acute Hypoxic Respiratory Failure 2/2 COVID PNA  - Supplemental oxygen, wean as tolerated; keep SpO2 >88%  - Decadron/Redemsivir day 4  - daily LFTs    COVID-19 PNA  Positive result on 5/8/21. Imaging revealing bilateral ground glass opacities. Lymphopenia & elevated inflammatory markers. - See treatment plan above     Left sided facial pain  Recurrent paroxysms of unilateral facial pain. . Pain is severe, lasting few seconds to minutes, and electric shock-like quality. Precipitated by grinding teeth together  - MRI brain w w/o contrast: No MRI evidence of vascular compression or other abnormality involving the trigeminal nerves.    -ESR wnl  -suspicion for temporal arteritis and  trigeminal neuralgia low  -Likely from trauma experienced at hair salon    Hyponatremia (resolved)    Hypokalemia/Hypomagnesemia  -2/2 to decreased intake from nausea  -Will replete  Zofran as needed    Code Status: Full Code  FEN:  DIET GENERAL;  PPX: Lovenox BID  DISPO: F      This patient will be discussed with attending, Rhoda Severance, MD.    Armond Pruett MD MD, PGY- 1  5/19/2021,  8:58 AM

## 2021-05-20 VITALS
BODY MASS INDEX: 30.59 KG/M2 | TEMPERATURE: 98 F | HEART RATE: 71 BPM | OXYGEN SATURATION: 93 % | WEIGHT: 162.04 LBS | DIASTOLIC BLOOD PRESSURE: 81 MMHG | RESPIRATION RATE: 16 BRPM | HEIGHT: 61 IN | SYSTOLIC BLOOD PRESSURE: 145 MMHG

## 2021-05-20 LAB
ALBUMIN SERPL-MCNC: 2.5 G/DL (ref 3.4–5)
ALP BLD-CCNC: 59 U/L (ref 40–129)
ALT SERPL-CCNC: 20 U/L (ref 10–40)
ANION GAP SERPL CALCULATED.3IONS-SCNC: 8 MMOL/L (ref 3–16)
AST SERPL-CCNC: 44 U/L (ref 15–37)
BASOPHILS ABSOLUTE: 0 K/UL (ref 0–0.2)
BASOPHILS RELATIVE PERCENT: 0.4 %
BILIRUB SERPL-MCNC: <0.2 MG/DL (ref 0–1)
BILIRUBIN DIRECT: <0.2 MG/DL (ref 0–0.3)
BILIRUBIN, INDIRECT: ABNORMAL MG/DL (ref 0–1)
BUN BLDV-MCNC: 5 MG/DL (ref 7–20)
CALCIUM SERPL-MCNC: 7.6 MG/DL (ref 8.3–10.6)
CHLORIDE BLD-SCNC: 102 MMOL/L (ref 99–110)
CO2: 26 MMOL/L (ref 21–32)
CREAT SERPL-MCNC: <0.5 MG/DL (ref 0.6–1.2)
EOSINOPHILS ABSOLUTE: 0 K/UL (ref 0–0.6)
EOSINOPHILS RELATIVE PERCENT: 0.5 %
GFR AFRICAN AMERICAN: >60
GFR NON-AFRICAN AMERICAN: >60
GLUCOSE BLD-MCNC: 120 MG/DL (ref 70–99)
GLUCOSE BLD-MCNC: 132 MG/DL (ref 70–99)
GLUCOSE BLD-MCNC: 99 MG/DL (ref 70–99)
HCT VFR BLD CALC: 42.6 % (ref 36–48)
HEMOGLOBIN: 14.8 G/DL (ref 12–16)
LYMPHOCYTES ABSOLUTE: 0.2 K/UL (ref 1–5.1)
LYMPHOCYTES RELATIVE PERCENT: 8.4 %
MAGNESIUM: 1.8 MG/DL (ref 1.8–2.4)
MCH RBC QN AUTO: 31.3 PG (ref 26–34)
MCHC RBC AUTO-ENTMCNC: 34.7 G/DL (ref 31–36)
MCV RBC AUTO: 90.2 FL (ref 80–100)
MONOCYTES ABSOLUTE: 0.1 K/UL (ref 0–1.3)
MONOCYTES RELATIVE PERCENT: 5.6 %
NEUTROPHILS ABSOLUTE: 1.9 K/UL (ref 1.7–7.7)
NEUTROPHILS RELATIVE PERCENT: 85.1 %
PDW BLD-RTO: 13.4 % (ref 12.4–15.4)
PERFORMED ON: ABNORMAL
PERFORMED ON: NORMAL
PHOSPHORUS: 2.5 MG/DL (ref 2.5–4.9)
PLATELET # BLD: 113 K/UL (ref 135–450)
PMV BLD AUTO: 8.6 FL (ref 5–10.5)
POTASSIUM SERPL-SCNC: 3.3 MMOL/L (ref 3.5–5.1)
RBC # BLD: 4.73 M/UL (ref 4–5.2)
SODIUM BLD-SCNC: 136 MMOL/L (ref 136–145)
TOTAL PROTEIN: 5.1 G/DL (ref 6.4–8.2)
WBC # BLD: 2.2 K/UL (ref 4–11)

## 2021-05-20 PROCEDURE — 85025 COMPLETE CBC W/AUTO DIFF WBC: CPT

## 2021-05-20 PROCEDURE — 6370000000 HC RX 637 (ALT 250 FOR IP): Performed by: STUDENT IN AN ORGANIZED HEALTH CARE EDUCATION/TRAINING PROGRAM

## 2021-05-20 PROCEDURE — 80069 RENAL FUNCTION PANEL: CPT

## 2021-05-20 PROCEDURE — 6360000002 HC RX W HCPCS: Performed by: INTERNAL MEDICINE

## 2021-05-20 PROCEDURE — 36415 COLL VENOUS BLD VENIPUNCTURE: CPT

## 2021-05-20 PROCEDURE — 2580000003 HC RX 258: Performed by: STUDENT IN AN ORGANIZED HEALTH CARE EDUCATION/TRAINING PROGRAM

## 2021-05-20 PROCEDURE — 94680 O2 UPTK RST&XERS DIR SIMPLE: CPT

## 2021-05-20 PROCEDURE — 94761 N-INVAS EAR/PLS OXIMETRY MLT: CPT

## 2021-05-20 PROCEDURE — 2700000000 HC OXYGEN THERAPY PER DAY

## 2021-05-20 PROCEDURE — 80076 HEPATIC FUNCTION PANEL: CPT

## 2021-05-20 PROCEDURE — 83735 ASSAY OF MAGNESIUM: CPT

## 2021-05-20 RX ORDER — LANOLIN ALCOHOL/MO/W.PET/CERES
400 CREAM (GRAM) TOPICAL DAILY
Status: DISCONTINUED | OUTPATIENT
Start: 2021-05-20 | End: 2021-05-20 | Stop reason: HOSPADM

## 2021-05-20 RX ORDER — DEXAMETHASONE 6 MG/1
6 TABLET ORAL DAILY
Qty: 5 TABLET | Refills: 0 | Status: SHIPPED | OUTPATIENT
Start: 2021-05-20 | End: 2021-05-25

## 2021-05-20 RX ORDER — POTASSIUM CHLORIDE 20 MEQ/1
40 TABLET, EXTENDED RELEASE ORAL ONCE
Status: COMPLETED | OUTPATIENT
Start: 2021-05-20 | End: 2021-05-20

## 2021-05-20 RX ADMIN — Medication 10 ML: at 07:49

## 2021-05-20 RX ADMIN — ENOXAPARIN SODIUM 30 MG: 30 INJECTION SUBCUTANEOUS at 07:48

## 2021-05-20 RX ADMIN — POTASSIUM CHLORIDE 40 MEQ: 1500 TABLET, EXTENDED RELEASE ORAL at 07:48

## 2021-05-20 RX ADMIN — Medication 400 MG: at 07:48

## 2021-05-20 RX ADMIN — PANTOPRAZOLE SODIUM 40 MG: 40 TABLET, DELAYED RELEASE ORAL at 07:03

## 2021-05-20 ASSESSMENT — PAIN SCALES - GENERAL
PAINLEVEL_OUTOF10: 0

## 2021-05-20 NOTE — DISCHARGE SUMMARY
instructed to return to the ED should she develop signs of including but not limited to increased cough, increased dyspnea, increased sputum or any kind of respiratory distress. Physical Exam:  BP (!) 145/81   Pulse 71   Temp 98 °F (36.7 °C) (Oral)   Resp 16   Ht 5' 0.98\" (1.549 m)   Wt 162 lb 0.6 oz (73.5 kg)   SpO2 93%   BMI 30.63 kg/m²   Constitutional:       Appearance: Normal appearance. HENT:      Head: Normocephalic and atraumatic. Cardiovascular:      Rate and Rhythm: Normal rate and regular rhythm. Pulses: Normal pulses. Heart sounds: Normal heart sounds. Pulmonary:      Effort: Pulmonary effort is normal.      Breath sounds: normal, has pain all over her chest on deep inspiration    Abdominal:      Palpations: Abdomen is soft. Non tender  Skin:     General: Skin is warm and dry. Neurological:      General: No focal deficit present. Mental Status: She is alert and oriented to person, place, and time. Mental status is at baseline. Consults: none  Significant Diagnostic Studies:   XR CHEST PORTABLE   Final Result      Diffuse groundglass and interstitial opacities similar to the prior study. XR CHEST PORTABLE   Final Result   Impression: Interval increase in diffuse bilateral pulmonary opacities. MRI BRAIN W WO CONTRAST   Final Result      1. No acute intracranial abnormality. No MRI evidence of vascular compression or other abnormality involving the trigeminal nerves. 2.  Mild cerebral atrophy and mild chronic small vessel ischemic disease. XR CHEST PORTABLE   Final Result      Mild bilateral interstitial opacities and patchy left lung groundglass opacities. Recommend correlation for mild interstitial edema or viral pneumonia.            Disposition: home on home O2  Discharged Condition: Stable  Follow Up: Primary Care Physician in 3 days    DISCHARGE MEDICATION:     Medication List      START taking these medications    apixaban 2.5 MG Tabs tablet  Commonly known as: Eliquis  Take 1 tablet by mouth 2 times daily     dexamethasone 6 MG tablet  Commonly known as: DECADRON  Take 1 tablet by mouth daily for 5 days        CONTINUE taking these medications    b complex vitamins capsule     butalbital-aspirin-caffeine -40 MG capsule  Commonly known as: Fiorinal  Take 1 capsule by mouth every 4 hours as needed for Headaches (ear ache.) for up to 24 doses. calcium carbonate 500 MG Tabs tablet  Commonly known as: OSCAL     Cranberry 405 MG Caps     GLUCOSAMINE CHONDR 500 COMPLEX PO     ibuprofen 200 MG tablet  Commonly known as: ADVIL;MOTRIN     omeprazole 20 MG delayed release capsule  Commonly known as: PRILOSEC  Take 1 capsule by mouth Daily     pregabalin 25 MG capsule  Commonly known as: Lyrica  Take 1 capsule by mouth 3 times daily for 90 days.  Increase to 50 mg TID in 24 - 48 hours if no pain relief.     vitamin D3 10 MCG (400 UNIT) Tabs tablet  Commonly known as: CHOLECALCIFEROL        STOP taking these medications    azithromycin 250 MG tablet  Commonly known as: ZITHROMAX     ST ZHENG WORT PO           Where to Get Your Medications      These medications were sent to [facility] Wor[facility] [address] - P [phone] - F [phone]  . [address] [facility] [address]    Phone: [phone]   · apixaban 2.5 MG Tabs tablet  · dexamethasone 6 MG tablet       Activity: activity as tolerated  Diet: regular diet  Wound Care: none needed    Time Spent on discharge is more than 30 minutes    Signed:  Destin Griffin MD,  MD   5/20/2021

## 2021-05-20 NOTE — CARE COORDINATION
Case Management Assessment            Discharge Note                    Date / Time of Note: 5/20/2021 3:29 PM                  Discharge Note Completed by: Eusebio Pérez RN    Patient Name: Cara Garrido   YOB: 1945  Diagnosis: Pneumonia due to COVID-19 virus [U07.1, J12.82]   Date / Time: 5/15/2021  5:17 PM    Current PCP: Ervin Calixto MD  Clinic patient: No    Hospitalization in the last 30 days: No    Advance Directives:  Code Status: Full Code  [place] DNR form completed and on chart: No    Financial:  Payor: Lori Carrington / Plan: Renata Lat ESSENTIAL/PLUS / Product Type: *No Product type* /      Pharmacy:    [address] [facility], [address] [phone] - f [phone]  [facility] [address]  Phone: [phone] Fax: [phone]    [address], [address] [phone] - F [phone]  [address] [place] [address] [facility] [address]  Phone: [phone] Fax: [phone] [phone] jaime Velásquez [phone] [address] [place] [address]  Phone: [phone] Fax: [phone]      Assistance purchasing medications?: Potential Assistance Purchasing Medications: No  Assistance provided by Case Management: None at this time    Does patient want to participate in local refill/ meds to beds program?: No    Meds To Beds General Rules:  1. Can ONLY be done Monday- Friday between 8:30am-5pm  2. Prescription(s) must be in pharmacy by 3pm to be filled same day  3. Copy of patient's insurance/ prescription drug card and patient face sheet must be sent along with the prescription(s)  4. Cost of Rx cannot be added to hospital bill. If financial assistance is needed, please contact unit  or ;  or  CANNOT provide pharmacy voucher for patients co-pays  5.  Patients can then  the prescription on their way out of the hospital at discharge, or pharmacy can deliver to the bedside if staff is available. (payment due at time of pick-up or delivery - cash, check, or card accepted)     Able to afford home medications/ co-pay costs: Yes    ADLS:  Current PT AM-PAC Score:   /24  Current OT AM-PAC Score:   /24      DISCHARGE Disposition: [address]     LOC at discharge: Not Applicable  SAMIR Completed: Yes    Notification completed in HENS/PAS?:  Not Applicable    IMM Completed:   Yes, Case management has presented and reviewed IMM letter #2 to the patient and/or family/ POA. Patient and/or family/POA verbalized understanding of their medicare rights and appeal process if needed. Patient and/or family/POA has signed, initialed and placed today's date (05.20.2021) and time () on IMM letter #2 on the the appropriate lines. Patient and/or family/POA, copy of letter offered and they are aware that this original copy of IMM letter #2 is available prior to discharge from the paper chart on the unit. Electronic documentation has been entered into epic for IMM letter #2 and original paper copy has been added to the paper chart at the nurses station.      Transportation:  Transportation PLAN for discharge: family   Mode of Transport: Private Car  Reason for medical transport: Not Applicable  Name of [address] [place] [address],[address]: Not Applicable  Time of Transport:     Transport form completed: No, Not Indicated    Home Care:  [address] ordered at discharge: Yes  [address]: [address]   Phone: [phone]  Fax: [phone]  Orders faxed: Yes    Durable Medical Equipment:  DME Provider:   Equipment obtained during hospitalization:     Home Oxygen and Respiratory Equipment:  Oxygen needed at discharge?: Yes  [address] [facility]: [facility]  Phone: [phone]   Portable tank available for discharge?: Yes    Dialysis:  Dialysis patient: No    Dialysis Center:  Not Applicable    Hospice Services:  Location: Not Applicable  Agency: Not Applicable    Consents signed: No, Not Indicated    Referrals made at [facility] for outpatient continued care:  Not Applicable    Additional CM Notes: The Plan for Transition of Care is related to the following treatment goals of Pneumonia due to COVID-19 virus [U07.1, J12.82]    The Patient and/or patient representative Clari Blackwell and her family were provided with a choice of provider and agrees with the discharge plan Yes    Freedom of choice list was provided with basic dialogue that supports the patient's individualized plan of care/goals and shares the quality data associated with the providers.  Yes    Care Transitions patient: Yes    Nely Boyd RN  [facility]  Case Management Department  Ph: [phone]  Fax: [phone]

## 2021-05-20 NOTE — PLAN OF CARE
Problem: Pain:  Description: Pain management should include both nonpharmacologic and pharmacologic interventions. Goal: Pain level will decrease  Description: Pain level will decrease  5/20/2021 1343 by Viola Acevedo RN  Outcome: Met This Shift  Note: Pt has no c/o pain at this time. Problem: Body Temperature -  Risk of, Imbalanced  Goal: Ability to maintain a body temperature within defined limits  Outcome: Met This Shift  Note: Pt has been afebrile this shift. Problem: Falls - Risk of:  Goal: Will remain free from falls  Description: Will remain free from falls  Outcome: Met This Shift  Note: Pt has been free from falls this shift. PT is a SBA with ambulation. Problem: Airway Clearance - Ineffective  Goal: Achieve or maintain patent airway  Outcome: Ongoing  Note: Pt has oxygen saturations >90% on 5L when resting. Pt does desat when OOB. Pt has SOB on exertion. Problem: Gas Exchange - Impaired  Goal: Absence of hypoxia  5/20/2021 1343 by Viola Acevedo RN  Outcome: Ongoing     Problem: Gas Exchange - Impaired  Goal: Promote optimal lung function  Outcome: Ongoing     Problem: Nutrition Deficits  Goal: Optimize nutritional status  5/20/2021 1343 by Viola Acevedo RN  Outcome: Ongoing  Note: Pt c/o loss of appetite d/t COVID-19. Pt drinking water throughout the shift.

## 2021-05-20 NOTE — PROGRESS NOTES
[facility]    Respiratory Therapy     Home Oxygen Evaluation        Name: [address] Record Number: [id number]  Age: 77 y.o. Gender:  female   : 1945  Today's date: 2021  Room: [address] [place] [address]      Assessment        BP (!) 145/81   Pulse 71   Temp 98 °F (36.7 °C) (Oral)   Resp 16   Ht 5' 0.98\" (1.549 m)   Wt 162 lb 0.6 oz (73.5 kg)   SpO2 95%   BMI 30.63 kg/m²     Patient Active Problem List   Diagnosis    Fibromyalgia    Tinnitus    GERD (gastroesophageal reflux disease)    Migraine    Cubital tunnel syndrome    Eyelid dermatitis, eczematous    Osteoporosis    Recurrent UTI    Osteoarthritis, knee    Osteoarthritis of thumb    Trigger finger of right thumb    Ulnar neuropathy of left upper extremity    COVID-19 virus infection    Pneumonia due to COVID-19 virus       Social History:  Social History     Tobacco Use    Smoking status: Never Smoker    Smokeless tobacco: Never Used   Substance Use Topics    Alcohol use: No    Drug use: No       Patient Room Air saturation at rest 87  %  Patient Room Air saturation upon ambulation 85 %    Oxygen saturations of 88% or less on RA qualifies patient for Home Oxygen    Patient resting on 0  lmp  with an oxygen saturation of  87 %     Patient ambulated on 0 lpm with an oxygen saturation of 85%    Patient ambulated on 1 lpm with an oxygen saturation of 86%    Patient ambulated on 2 lpm with an oxygen saturation of 89%     Qualifying patient for home oxygen with ambulation and continuous flow  @ 2 lpm.      In your clinical assessment does the Patient Require Portable Oxygen Tanks?     Yes               Patient/caregiver was educated on Home Oxygen process:  Yes      Level of patient/caregiver understanding able to:   [x] Verbalize understanding   [x] Demonstrate understanding       [] Teach back        [] Needs reinforcement        []  No available caregiver               []  Other:     Response to education:  Excellent Time Spent with Home O2 Set Up:  10  minutes     RISHI Rutherford on 5/20/2021 at 2:38 PM                 .

## 2021-05-20 NOTE — DISCHARGE INSTR - COC
Continuity of Care Form    Patient Name: Sussy Galo   :  1945  MRN:  [id number]    Admit date:  5/15/2021  Discharge date:  2021

## 2021-05-20 NOTE — PLAN OF CARE
Problem: Pain:  Goal: Pain level will decrease  Description: Pain level will decrease  Outcome: Ongoing  Note: Pt has no complaints of pain at this time     Problem: Gas Exchange - Impaired  Goal: Absence of hypoxia  Outcome: Ongoing  Note: On 5L NC     Problem: Breathing Pattern - Ineffective  Goal: Ability to achieve and maintain a regular respiratory rate  Outcome: Ongoing  Note: Regular rate and rhytm     Problem: Isolation Precautions - Risk of Spread of Infection  Goal: Prevent transmission of infection  Outcome: Ongoing  Note: Droplet plus isolation for covid infection     Problem: Nutrition Deficits  Goal: Optimize nutritional status  Outcome: Ongoing  Note: Unable to tolerate more than 25% of meals

## 2021-05-20 NOTE — PROGRESS NOTES
Internal Medicine  PGY 1  Progress note    CC: left ear pain     HistoryObtained From:  patient      Interval hx:  [place]. Afeb, other vitals stable. SpO2 93-94 on 5 L, and 89-90 percent on RA. CBC significant for pancytopenia which is stable and is improving SA. I feel this pancytopenia is from her systemic illness 2/2 to covid. S/p 5 days of decadron/RDV. Hypokalemia/Hypomagnesemia 2/2 to decreased intake from nausea. She is stable and is a good candidate for home O2 eval.    HISTORY OF PRESENT ILLNESS:  Sana Seymour is a 77 y.o. female with PMH of presumed left-sided trigeminal neuralgia, fibromyalgia, osteoporosis, and recent positive COVID-19 result (5/8) who p/w left-sided ear pain. She describes the pain to initially start on the left side has a shocking electric pain that shoots across her face to her right ear. She states the pain has now deescalated to just the left side now. She states the pain is severe, 10/10, intermittent, lasting only a few seconds, and prevents her from sleeping. She has tried taking Fioricet and Lyrica but has minimal improvement of her symptoms. Patient was recently hospitalized for similar presentation except the pain was bilaterally on both ears. She was given IV Toradol, Depakote, Neurontin and IVF which seemed to help somewhat with her pain. She denies any tinnitus, fever, chills, rash, hearing loss, vision changes, jaw pain, chest pain or shortness of breath.      ED course:   Vitals: wnl  Exam: Tenderness on palpation of left pinna, normal TM & auditory canal; mild crackles in middle & lower lung fields  Labs: Hyponatremic 133, hypglycemia 153, WBC 2.1  Imaging: CXR - bilateral interstitial ground glass opacities   Interventions: IV Toradol  & Depacon, 1 liter of LR    Past Medical History:        Diagnosis Date    Bladder problem     Bleeding disorder (HCC)     Fibromyalgia     Migraine     Osteoporosis     Recurrent UTI     Skin lesion of left leg 3/15/2012  Vertigo 10/23/2012       Past Surgical History:        Procedure Laterality Date    APPENDECTOMY  1970    BLADDER SUSPENSION      Dr. [facility]ez [facility]    Right lumpectomy    COLON SURGERY      benign large growth    HYSTERECTOMY      Partial    POLYPECTOMY      colon       Medications Prior to Admission:    Medications Prior to Admission: pregabalin (LYRICA) 25 MG capsule, Take 1 capsule by mouth 3 times daily for 90 days. Increase to 50 mg TID in 24 - 48 hours if no pain relief. butalbital-aspirin-caffeine (FIORINAL) -40 MG capsule, Take 1 capsule by mouth every 4 hours as needed for Headaches (ear ache.) for up to 24 doses. omeprazole (PRILOSEC) 20 MG delayed release capsule, Take 1 capsule by mouth Daily  ST ZHENG WORT PO, Take by mouth  Glucosamine-Chondroit-Vit C-Mn (GLUCOSAMINE CHONDR 500 COMPLEX PO), Take by mouth  vitamin D3 (CHOLECALCIFEROL) 400 units TABS tablet, Take 400 Units by mouth daily  ibuprofen (ADVIL;MOTRIN) 200 MG tablet, Take 200 mg by mouth every 6 hours as needed for Pain  Cranberry 405 MG CAPS, Take 1 capsule by mouth daily   calcium carbonate (OSCAL) 500 MG TABS tablet, Take 500 mg by mouth daily. b complex vitamins capsule, Take 1 capsule by mouth daily. [] azithromycin (ZITHROMAX) 250 MG tablet, Take 1 tablet by mouth See Admin Instructions for 5 days 500mg on day 1 followed by 250mg on days 2 - 5  ·   Allergies: Other    Social History:   · TOBACCO: reports that she has never smoked. She has never used smokeless tobacco.  · ETOH:   reports no history of alcohol use. · DRUGS : no illicit drug use  · Patient currently lives with family.     Family History:       Problem Relation Age of Onset    Cancer Mother         skin    Other Mother 80        DVT, PE    High Blood Pressure Father     Stroke Father     Heart Disease Father         MI    High Cholesterol Brother     High Blood Pressure Brother     Asthma Brother Physical Exam  Constitutional:       Appearance: Normal appearance. HENT:      Head: Normocephalic and atraumatic. Cardiovascular:      Rate and Rhythm: Normal rate and regular rhythm. Pulses: Normal pulses. Heart sounds: Normal heart sounds. Pulmonary:      Effort: Pulmonary effort is normal.      Breath sounds: normal, has pain all over her chest on deep inspiration    Abdominal:      Palpations: Abdomen is soft. Non tender  Skin:     General: Skin is warm and dry. Neurological:      General: No focal deficit present. Mental Status: She is alert and oriented to person, place, and time. Mental status is at baseline. Vitals:    05/20/21 0740   BP: (!) 157/80   Pulse: 69   Resp: 18   Temp: 97.6 °F (36.4 °C)   SpO2: 92%       DATA:    Labs:  BMP:   Recent Labs     05/18/21  0534 05/19/21  0450 05/20/21  0455   * 138 136   K 3.6 3.3* 3.3*    103 102   CO2 23 24 26   BUN 9 8 5*   CREATININE 0.6 <0.5* <0.5*   GLUCOSE 126* 126* 132*     CBC:   Recent Labs     05/18/21  0534 05/19/21  0450 05/20/21  0455   WBC 3.6* 2.9* 2.2*   HGB 12.9 11.8* 14.8   HCT 37.0 33.2* 42.6    153 113*       LFT's:   Recent Labs     05/18/21  0534 05/19/21 0450 05/20/21  0455   AST 43* 35 44*   ALT 18 15 20   BILITOT <0.2 <0.2 <0.2   ALKPHOS 61 63 59     Troponin:   No results for input(s): TROPONINI in the last 72 hours. BNP: No results for input(s): BNP in the last 72 hours. ABGs: No results for input(s): PHART, DME1DJC, PO2ART in the last 72 hours. INR: No results for input(s): INR in the last 72 hours. U/A:No results for input(s): NITRITE, COLORU, PHUR, LABCAST, WBCUA, RBCUA, MUCUS, TRICHOMONAS, YEAST, BACTERIA, CLARITYU, SPECGRAV, LEUKOCYTESUR, UROBILINOGEN, BILIRUBINUR, BLOODU, GLUCOSEU, AMORPHOUS in the last 72 hours. Invalid input(s): KETONESU    XR CHEST PORTABLE   Final Result      Diffuse groundglass and interstitial opacities similar to the prior study. XR CHEST PORTABLE   Final Result   Impression: Interval increase in diffuse bilateral pulmonary opacities. MRI BRAIN W WO CONTRAST   Final Result      1. No acute intracranial abnormality. No MRI evidence of vascular compression or other abnormality involving the trigeminal nerves. 2.  Mild cerebral atrophy and mild chronic small vessel ischemic disease. XR CHEST PORTABLE   Final Result      Mild bilateral interstitial opacities and patchy left lung groundglass opacities. Recommend correlation for mild interstitial edema or viral pneumonia. ASSESSMENT AND PLAN:  Yina Hooker is a 77 y.o. female presents with left-sided ear pain and was admitted for acute hypoxic respiratory failure 2/2 COVID pneumonia. Acute Hypoxic Respiratory Failure 2/2 COVID PNA  - Supplemental oxygen, wean as tolerated; keep SpO2 >88%  -s/p 5 days of Decadron/Redemsivir  - Home O2 eval    COVID-19 PNA  Positive result on 5/8/21. Imaging revealing bilateral ground glass opacities. Lymphopenia & elevated inflammatory markers. - See treatment plan above     Left sided facial pain  Recurrent paroxysms of unilateral facial pain. . Pain is severe, lasting few seconds to minutes, and electric shock-like quality. Precipitated by grinding teeth together  - MRI brain w w/o contrast: No MRI evidence of vascular compression or other abnormality involving the trigeminal nerves.    -ESR wnl  -suspicion for temporal arteritis and  trigeminal neuralgia low  -Likely from trauma experienced at hair salon    Hyponatremia (resolved)    Hypokalemia/Hypomagnesemia  -2/2 to decreased intake from nausea  -Will replete  Zofran as needed    Code Status: Full Code  FEN:  DIET GENERAL;  PPX: Lovenox BID  DISPO: F      This patient will be discussed with attending, Dunia Juan MD.    Eloina Funes MD MD, PGY- 1  5/20/2021,  10:08 AM

## 2021-05-20 NOTE — PROGRESS NOTES
Spoke with patient's  at this time. All updates given. MD sent message to contact  at earliest convenience.

## 2021-05-21 ENCOUNTER — CARE COORDINATION (OUTPATIENT)
Dept: CASE MANAGEMENT | Age: 76
End: 2021-05-21

## 2021-05-24 ENCOUNTER — CARE COORDINATION (OUTPATIENT)
Dept: CASE MANAGEMENT | Age: 76
End: 2021-05-24

## 2021-05-24 NOTE — CARE COORDINATION
Date/Time:  5/24/2021 2:44 PM  Attempted to reach patient by telephone. Call within 2 business days of discharge: Yes, Left HIPPA compliant message requesting a return call. CTN will close out COVID -19 Monitoring episode at this time.       Thank Art Blanc RN  Care Transition Coordinator  Contact GUEVARACE:[phone]

## 2021-05-25 ENCOUNTER — TELEPHONE (OUTPATIENT)
Dept: FAMILY MEDICINE CLINIC | Age: 76
End: 2021-05-25

## 2021-05-25 DIAGNOSIS — T30.4 CHEMICAL BURN: Primary | ICD-10-CM

## 2021-05-25 NOTE — TELEPHONE ENCOUNTER
PT states she has a chemical burn from a perm,  states there is a black spot on her ear from it. States she can break out easily. States that she still has COVID, and can not come in but VV's never work. She wants to know what she can use to put on this. Does not need prescription if she needs an appointment, just wants advise as far as what ointments will help chemical burn. Please advise, thank you.

## 2021-05-25 NOTE — TELEPHONE ENCOUNTER
Left ear, black spot which started as a blister after receiving a permanent on her hair. It appears to be a scabbed over. The patient and her  were advised to put an antibiotic ointment over the area and cover if needed. The area has no redness around the area and no drainage. If the area becomes painful, has oozing, pus or redness she is to call back to the office. Patient also states she has an area on the top of her ear which is irritated by the use of her oxygen tubing. She was advised to place antibiotic oint on that area as well and to cushion it with cotton, a tissue or a small piece of cloth. Patient will call back if these 2 areas are not resolved.

## 2021-06-03 ENCOUNTER — TELEPHONE (OUTPATIENT)
Dept: PHARMACY | Facility: CLINIC | Age: 76
End: 2021-06-03

## 2021-06-03 NOTE — TELEPHONE ENCOUNTER
CLINICAL PHARMACY NOTE  Post-Discharge Transitions of Care ([facility])    Patient appears to have been discharged from [facility]   on 5/14/21. Patient not found in Outcomes MTM. Please follow-up with patient to review medications.       30 days since discharge = 6/19/21     Jamil [address]  [phone] or [phone] (Option 7)

## 2021-06-07 NOTE — TELEPHONE ENCOUNTER
Attempted to reach patient again to review medications. Left second message asking for return call. Will send Mychart message and close encounter at this time.      Enrrique Ross PharmD, [facility]  Direct: [phone]  Department, toll free: [phone], option 7    =======================================================   For Pharmacy Admin Tracking Only     Gap Closed?: No    Time Spent (min): 5

## 2021-07-28 ENCOUNTER — TELEPHONE (OUTPATIENT)
Dept: FAMILY MEDICINE CLINIC | Age: 76
End: 2021-07-28

## 2022-02-02 ENCOUNTER — VIRTUAL VISIT (OUTPATIENT)
Dept: FAMILY MEDICINE CLINIC | Age: 77
End: 2022-02-02
Payer: MEDICARE

## 2022-02-02 DIAGNOSIS — J01.00 ACUTE NON-RECURRENT MAXILLARY SINUSITIS: Primary | ICD-10-CM

## 2022-02-02 DIAGNOSIS — Z23 NEED FOR COVID-19 VACCINE: ICD-10-CM

## 2022-02-02 PROCEDURE — 99213 OFFICE O/P EST LOW 20 MIN: CPT | Performed by: FAMILY MEDICINE

## 2022-02-02 RX ORDER — AZITHROMYCIN 250 MG/1
TABLET, FILM COATED ORAL
Qty: 1 PACKET | Refills: 0 | Status: SHIPPED | OUTPATIENT
Start: 2022-02-02 | End: 2022-02-12

## 2022-02-02 NOTE — PROGRESS NOTES
2022    TELEHEALTH EVALUATION -- Audio/Visual (During CDAWG-78 public health emergency)    HPI:    Max Adan (:  1945) has requested an audio/video evaluation for the following concern(s):    Congestion    On week URI, getting worse. Nasal congestion, dry cough, facial pain. She denies, fever, chest pain, dyspnea,. Sudafed helps for a few hours. No nausea, vomiting. Diarrhea chronic, comes and goes - stable. Had a negative COVID test a few days ago. Is not vaccinated against COVID. Is not vaccinated against most diseases. Review of Systems    Outpatient Medications Marked as Taking for the 22 encounter (Virtual Visit) with Yonathan Holland MD   Medication Sig Dispense Refill    apixaban (ELIQUIS) 2.5 MG TABS tablet Take 1 tablet by mouth 2 times daily 10 tablet 0    omeprazole (PRILOSEC) 20 MG delayed release capsule Take 1 capsule by mouth Daily 30 capsule 5    Glucosamine-Chondroit-Vit C-Mn (GLUCOSAMINE CHONDR 500 COMPLEX PO) Take by mouth      vitamin D3 (CHOLECALCIFEROL) 400 units TABS tablet Take 400 Units by mouth daily      ibuprofen (ADVIL;MOTRIN) 200 MG tablet Take 200 mg by mouth every 6 hours as needed for Pain      Cranberry 405 MG CAPS Take 1 capsule by mouth daily       calcium carbonate (OSCAL) 500 MG TABS tablet Take 500 mg by mouth daily.  b complex vitamins capsule Take 1 capsule by mouth daily.           Social History     Tobacco Use    Smoking status: Never Smoker    Smokeless tobacco: Never Used   Substance Use Topics    Alcohol use: No    Drug use: No        Allergies   Allergen Reactions    Other      Butazolidin- pt states was given many yrs ago for varicose veins and pt had adverse reaction   ,   Past Medical History:   Diagnosis Date    Bladder problem     Bleeding disorder ([facility] [address]     Fibromyalgia     Migraine     Osteoporosis     Recurrent UTI     Skin lesion of left leg 3/15/2012    Vertigo 10/23/2012       PHYSICAL EXAMINATION:  [ INSTRUCTIONS:  \"[x]\" Indicates a positive item  \"[]\" Indicates a negative item  -- DELETE ALL ITEMS NOT EXAMINED]  Vital Signs: (As obtained by patient/caregiver or practitioner observation)    Blood pressure-  Heart rate-    Respiratory rate-    Temperature-  Pulse oximetry-   Wt Readings from Last 3 Encounters:   05/15/21 162 lb 0.6 oz (73.5 kg)   05/12/21 162 lb (73.5 kg)   12/02/20 162 lb (73.5 kg)     Temp Readings from Last 3 Encounters:   05/20/21 98 °F (36.7 °C) (Oral)   05/12/21 98.3 °F (36.8 °C) (Oral)   03/09/21 98.6 °F (37 °C) (Temporal)     BP Readings from Last 3 Encounters:   05/20/21 (!) 145/81   05/12/21 124/67   03/09/21 123/76     Pulse Readings from Last 3 Encounters:   05/20/21 71   05/12/21 76   03/09/21 75      Constitutional: [x] Appears well-developed and well-nourished [x] No apparent distress      [] Abnormal-   Mental status  [x] Alert and awake  [x] Oriented to person/place/time [x]Able to follow commands      Eyes:  EOM    [x]  Normal  [] Abnormal-  Sclera  [x]  Normal  [] Abnormal -         Discharge [x]  None visible  [] Abnormal -    HENT:   [x] Normocephalic, atraumatic. [] Abnormal   [x] Mouth/Throat: Mucous membranes are moist.     Neck: [x] No visualized mass     Pulmonary/Chest: [x] Respiratory effort normal.  [x] No visualized signs of difficulty breathing or respiratory distress        [] Abnormal-      Musculoskeletal:   [x] Normal gait with no signs of ataxia       Neurological:        [x] No Facial Asymmetry (Cranial nerve 7 motor function) (limited exam to video visit)                  Skin:        [x] No significant exanthematous lesions or discoloration noted on facial skin         [] Abnormal-            Psychiatric:       [x] Normal Affect [] No Hallucinations        [] Abnormal-     Other pertinent observable physical exam findings-     ASSESSMENT/PLAN:  1.  Acute non-recurrent maxillary sinusitis  Side effects of current medications reviewed and questions answered. Call or return to clinic prn if these symptoms worsen or fail to improve as anticipated. - azithromycin (ZITHROMAX) 250 MG tablet; Take as directed on package insert  Dispense: 1 packet; Refill: 0    2. Need for COVID-19 vaccine  Encouraged. Concerns addressed. No follow-ups on file. Kaitlin Arriaga, was evaluated through a synchronous (real-time) audio-video encounter. The patient (or guardian if applicable) is aware that this is a billable service, which includes applicable co-pays. This Virtual Visit was conducted with patient's (and/or legal guardian's) consent. The visit was conducted pursuant to the emergency declaration under the [address] [place] [address] authority and the [employer] General Act. Patient identification was verified, and a caregiver was present when appropriate. The patient was located at home in a state where the provider was licensed to provide care. Total time spent on this encounter: Not billed by time    --Ferdinand Pineda MD on 2/2/2022 at 9:43 AM    An electronic signature was used to authenticate this note.

## 2022-04-14 NOTE — PROGRESS NOTES
Subjective:      Patient ID: Ondina Solorzano 69 y.o. female. is here for evaluation for IBS      HPI    COVID vaccine: she is still reluctant to have the vaccine. Addressed. IBS has been well controlled with eating apples most of the time. 2 months ago ate some \"suspect\" [place] food and since than has loose stool and cramping. Cramping occurs before BM. + urgency. No nausea, vomiting, heartburn, melena, hematochezia, fever, weight loss. She has no decrease in appetite. Having BM and cramping once or twice a day. If takes Imodium feels well with no BM or cramping for 2 to 3 days. No constipation. Symptoms are similar to IBS but milder. Rx: Imodium helps if takes it every 2 to 3 days. Did FIT test in the past 6 months from insurance co - was negative. Fibro pain flared with picking up sticks 3 weeks ago. Stiff and achy. Better if gets up and moves.      Lab Results   Component Value Date     05/20/2021    K 3.3 (L) 05/20/2021     05/20/2021    CO2 26 05/20/2021    BUN 5 (L) 05/20/2021    CREATININE <0.5 (L) 05/20/2021    GLUCOSE 132 (H) 05/20/2021    CALCIUM 7.6 (L) 05/20/2021    PROT 5.1 (L) 05/20/2021    LABALBU 2.5 (L) 05/20/2021    BILITOT <0.2 05/20/2021    ALKPHOS 59 05/20/2021    AST 44 (H) 05/20/2021    ALT 20 05/20/2021    LABGLOM >60 05/20/2021    GFRAA >60 05/20/2021    AGRATIO 1.5 07/30/2013    GLOB 3 07/30/2013        Lab Results   Component Value Date    WBC 2.2 (L) 05/20/2021    HGB 14.8 05/20/2021    HCT 42.6 05/20/2021    MCV 90.2 05/20/2021     (L) 05/20/2021     TSH   Date Value Ref Range Status   05/06/2019 1.740 0.270 - 4.200 mcIU/mL Final     Comment:     (NOTE)  Ingestion of reed doses of biotin (>5 mg/day) taken within 8 hours of  drawing blood sample can interfere with this immunoassay test.     12/20/2011 1.53 0.35 - 5.5 uIU/ml Final       Outpatient Medications Marked as Taking for the 4/15/22 encounter (Office Visit) with Pedro Rojas MD Medication Sig Dispense Refill    omeprazole (PRILOSEC) 20 MG delayed release capsule Take 1 capsule by mouth Daily 30 capsule 5    Glucosamine-Chondroit-Vit C-Mn (GLUCOSAMINE CHONDR 500 COMPLEX PO) Take by mouth      vitamin D3 (CHOLECALCIFEROL) 400 units TABS tablet Take 400 Units by mouth daily      ibuprofen (ADVIL;MOTRIN) 200 MG tablet Take 200 mg by mouth every 6 hours as needed for Pain      Cranberry 405 MG CAPS Take 1 capsule by mouth daily       calcium carbonate (OSCAL) 500 MG TABS tablet Take 500 mg by mouth daily.  b complex vitamins capsule Take 1 capsule by mouth daily.           Allergies   Allergen Reactions    Other      Butazolidin- pt states was given many yrs ago for varicose veins and pt had adverse reaction       Patient Active Problem List   Diagnosis    Fibromyalgia    Tinnitus    GERD (gastroesophageal reflux disease)    Migraine    Cubital tunnel syndrome    Eyelid dermatitis, eczematous    Osteoporosis    Recurrent UTI    Osteoarthritis, knee    Osteoarthritis of thumb    Trigger finger of right thumb    Ulnar neuropathy of left upper extremity    COVID-19 virus infection    Pneumonia due to COVID-19 virus       Past Medical History:   Diagnosis Date    Bladder problem     Bleeding disorder ([address]     Fibromyalgia     Migraine     Osteoporosis     Recurrent UTI     Skin lesion of left leg 3/15/2012    Vertigo 10/23/2012       Past Surgical History:   Procedure Laterality Date    APPENDECTOMY  1970    BLADDER SUSPENSION  2/12    Dr. Carlyle Jiménez    Right lumpectomy    COLON SURGERY  1978    benign large growth    HYSTERECTOMY  2000    Partial    POLYPECTOMY      colon        Family History   Problem Relation Age of Onset    Cancer Mother         skin    Other Mother 80        DVT, PE    High Blood Pressure Father     Stroke Father     Heart Disease Father         MI    High Cholesterol Brother     High Blood Pressure Brother  Asthma Brother        Social History     Tobacco Use    Smoking status: Never Smoker    Smokeless tobacco: Never Used   Substance Use Topics    Alcohol use: No    Drug use: No            Review of Systems  Review of Systems    Objective:   Physical Exam  Vitals:    04/15/22 1256   BP: 114/72   Pulse: 78   Resp: 12   Temp: 97.8 °F (36.6 °C)   TempSrc: Temporal   SpO2: 98%   Weight: 162 lb 9.6 oz (73.8 kg)       Physical Exam    NAD    Skin is warm and dry. No rash. Well hydrated  Alert and oriented x 3. Mood and affect are normal.  The neck is supple and free of adenopathy or masses, the thyroid is normal without enlargement or nodules. Chest: clear with no wheezes or rales. No retractions, or use of accessory muscles noted. Cardiovascular: PMI is not displaced, and no thrill noted. Regular rate and rhythm with no rub, murmur or gallop. No peripheral edema. The abdomen is soft without tenderness, guarding, mass, rebound or organomegaly. Aorta, femoral, DP and PT pulses intact. Assessment:       Diagnosis Orders   1. Loose stools  Comprehensive Metabolic Panel    CBC with Auto Differential    PANCREATIC ELASTASE, FECAL    Likely IBS. If anemic will need GI work up. OK to continue Imodium prn    2. Leukopenia, unspecified type  CBC with Auto Differential  Likely due to COVID    3. Glucose intolerance (impaired glucose tolerance)  Comprehensive Metabolic Panel    Hemoglobin A1C  Discussed diet, exercise and weight loss strategies    4. Fibromyalgia  TSH with Reflex  Continue low impact exercise. 5. Elevated AST (SGOT)  Comprehensive Metabolic Panel   6. Need for vaccination  encouraged COVID< shingix, TDAP          Plan:      Side effects of current medications reviewed and questions answered.

## 2022-04-15 ENCOUNTER — OFFICE VISIT (OUTPATIENT)
Dept: FAMILY MEDICINE CLINIC | Age: 77
End: 2022-04-15
Payer: MEDICARE

## 2022-04-15 VITALS
WEIGHT: 162.6 LBS | DIASTOLIC BLOOD PRESSURE: 72 MMHG | OXYGEN SATURATION: 98 % | SYSTOLIC BLOOD PRESSURE: 114 MMHG | RESPIRATION RATE: 12 BRPM | BODY MASS INDEX: 30.74 KG/M2 | HEART RATE: 78 BPM | TEMPERATURE: 97.8 F

## 2022-04-15 DIAGNOSIS — R73.02 GLUCOSE INTOLERANCE (IMPAIRED GLUCOSE TOLERANCE): ICD-10-CM

## 2022-04-15 DIAGNOSIS — M79.7 FIBROMYALGIA: ICD-10-CM

## 2022-04-15 DIAGNOSIS — D72.819 LEUKOPENIA, UNSPECIFIED TYPE: ICD-10-CM

## 2022-04-15 DIAGNOSIS — R19.5 LOOSE STOOLS: Primary | ICD-10-CM

## 2022-04-15 DIAGNOSIS — Z23 NEED FOR VACCINATION: ICD-10-CM

## 2022-04-15 DIAGNOSIS — R74.01 ELEVATED AST (SGOT): ICD-10-CM

## 2022-04-15 DIAGNOSIS — R19.5 LOOSE STOOLS: ICD-10-CM

## 2022-04-15 PROBLEM — U07.1 COVID-19 VIRUS INFECTION: Status: RESOLVED | Noted: 2021-05-13 | Resolved: 2022-04-15

## 2022-04-15 LAB
A/G RATIO: 2 (ref 1.1–2.2)
ALBUMIN SERPL-MCNC: 4.5 G/DL (ref 3.4–5)
ALP BLD-CCNC: 93 U/L (ref 40–129)
ALT SERPL-CCNC: 20 U/L (ref 10–40)
ANION GAP SERPL CALCULATED.3IONS-SCNC: 12 MMOL/L (ref 3–16)
AST SERPL-CCNC: 24 U/L (ref 15–37)
BASOPHILS ABSOLUTE: 0 K/UL (ref 0–0.2)
BASOPHILS RELATIVE PERCENT: 0.6 %
BILIRUB SERPL-MCNC: <0.2 MG/DL (ref 0–1)
BUN BLDV-MCNC: 16 MG/DL (ref 7–20)
CALCIUM SERPL-MCNC: 9.4 MG/DL (ref 8.3–10.6)
CHLORIDE BLD-SCNC: 102 MMOL/L (ref 99–110)
CO2: 24 MMOL/L (ref 21–32)
CREAT SERPL-MCNC: 0.6 MG/DL (ref 0.6–1.2)
EOSINOPHILS ABSOLUTE: 0.1 K/UL (ref 0–0.6)
EOSINOPHILS RELATIVE PERCENT: 2.2 %
GFR AFRICAN AMERICAN: >60
GFR NON-AFRICAN AMERICAN: >60
GLUCOSE BLD-MCNC: 101 MG/DL (ref 70–99)
HCT VFR BLD CALC: 39.5 % (ref 36–48)
HEMOGLOBIN: 13.4 G/DL (ref 12–16)
LYMPHOCYTES ABSOLUTE: 1.6 K/UL (ref 1–5.1)
LYMPHOCYTES RELATIVE PERCENT: 26.2 %
MCH RBC QN AUTO: 31.6 PG (ref 26–34)
MCHC RBC AUTO-ENTMCNC: 33.9 G/DL (ref 31–36)
MCV RBC AUTO: 93.1 FL (ref 80–100)
MONOCYTES ABSOLUTE: 0.4 K/UL (ref 0–1.3)
MONOCYTES RELATIVE PERCENT: 7.5 %
NEUTROPHILS ABSOLUTE: 3.8 K/UL (ref 1.7–7.7)
NEUTROPHILS RELATIVE PERCENT: 63.5 %
PDW BLD-RTO: 13.9 % (ref 12.4–15.4)
PLATELET # BLD: 150 K/UL (ref 135–450)
PMV BLD AUTO: 10.4 FL (ref 5–10.5)
POTASSIUM SERPL-SCNC: 4.4 MMOL/L (ref 3.5–5.1)
RBC # BLD: 4.25 M/UL (ref 4–5.2)
SODIUM BLD-SCNC: 138 MMOL/L (ref 136–145)
TOTAL PROTEIN: 6.7 G/DL (ref 6.4–8.2)
TSH REFLEX: 1.25 UIU/ML (ref 0.27–4.2)
WBC # BLD: 5.9 K/UL (ref 4–11)

## 2022-04-15 PROCEDURE — 99214 OFFICE O/P EST MOD 30 MIN: CPT | Performed by: FAMILY MEDICINE

## 2022-04-15 ASSESSMENT — PATIENT HEALTH QUESTIONNAIRE - PHQ9
1. LITTLE INTEREST OR PLEASURE IN DOING THINGS: 0
SUM OF ALL RESPONSES TO PHQ QUESTIONS 1-9: 0
SUM OF ALL RESPONSES TO PHQ9 QUESTIONS 1 & 2: 0
2. FEELING DOWN, DEPRESSED OR HOPELESS: 0
SUM OF ALL RESPONSES TO PHQ QUESTIONS 1-9: 0

## 2022-04-16 LAB
ESTIMATED AVERAGE GLUCOSE: 108.3 MG/DL
HBA1C MFR BLD: 5.4 %

## 2022-04-19 DIAGNOSIS — R19.5 LOOSE STOOLS: ICD-10-CM

## 2022-04-21 LAB — PANCREATIC ELASTASE, FECAL: >800 UG/G

## 2022-04-28 ENCOUNTER — TELEPHONE (OUTPATIENT)
Dept: FAMILY MEDICINE CLINIC | Age: 77
End: 2022-04-28

## 2022-04-28 NOTE — TELEPHONE ENCOUNTER
----- Message from Milena Zapien sent at 4/28/2022  8:19 AM EDT -----  Subject: Results Request    QUESTIONS  Which lab or imaging result is the patient calling about? stool sample  Which provider ordered the test? [facility]   At what location was the test performed? [facility]  Date the test was performed? 2022-04-12  Additional Information for Provider? Pt is calling requesting her lab   results from stool sample labs taken to lab on 4/12. Please call pt with   these results. ---------------------------------------------------------------------------  --------------  [employer] INFO  What is the best way for the office to contact you? OK to leave message on   voicemail, OK to respond with electronic message via [employer] portal (only   for patients who have registered [employer] account)  Preferred Call Back Phone Number? [phone]  ---------------------------------------------------------------------------  --------------  SCRIPT ANSWERS  Relationship to Patient?  Self

## 2022-04-28 NOTE — TELEPHONE ENCOUNTER
Patient called back and provided the fecal result. Patient states her diarrhea has settled and is mostly gone.

## 2022-05-25 ENCOUNTER — TELEPHONE (OUTPATIENT)
Dept: FAMILY MEDICINE CLINIC | Age: 77
End: 2022-05-25

## 2022-05-25 NOTE — TELEPHONE ENCOUNTER
----- Message from Niesha Joon sent at 5/25/2022  9:33 AM EDT -----  Subject: Appointment Request    Reason for Call: Semi-Routine Skin Problem    QUESTIONS  Type of Appointment? Established Patient  Reason for appointment request? No appointments available during search  Additional Information for Provider? Pt has what she believes was a bug   bit on her lower right leg but it has been there for 2 months and it is   not going away. It is red and dry. No appts available during search. Can   you please follow up with her to schedule?   ---------------------------------------------------------------------------  --------------  CALL BACK INFO  What is the best way for the office to contact you? OK to leave message on   voicemail  Preferred Call Back Phone Number? [phone]  ---------------------------------------------------------------------------  --------------  SCRIPT ANSWERS  Relationship to Patient? Self  Are you having swelling in your throat or face? No  Are you having difficulty breathing? No  Have the symptoms worsened or spread in the last day? No  Are you having fevers (100.4), chills or sweats? No  Have you recently (14 days) seen a provider for this issue? No  Have you been diagnosed with, awaiting test results for, or told that you   are suspected of having COVID-19 (Coronavirus)? (If patient has tested   negative or was tested as a requirement for work, school, or travel and   not based on symptoms, answer no)? No  Within the past 10 days have you developed any of the following symptoms   (answer no if symptoms have been present longer than 10 days or began   more than 10 days ago)? Fever or Chills, Cough, Shortness of breath or   difficulty breathing, Loss of taste or smell, Sore throat, Nasal   congestion, Sneezing or runny nose, Fatigue or generalized body aches   (answer no if pain is specific to a body part e.g. back pain), Diarrhea,   Headache?  No  Have you had close contact with someone with COVID-19 in the last 7 days? No  (Service Expert  click yes below to proceed with [employer]ual   Scheduling)?  Yes

## 2022-05-26 ENCOUNTER — TELEPHONE (OUTPATIENT)
Dept: FAMILY MEDICINE CLINIC | Age: 77
End: 2022-05-26

## 2022-05-26 NOTE — TELEPHONE ENCOUNTER
----- Message from Arlene Hagen sent at 5/26/2022  9:46 AM EDT -----  Subject: Message to Provider    QUESTIONS  Information for Provider? Pt returned call to office but we could not get   through. She is asking if there is anyway to get a later appt than 10:30   tomorrow as [facility] will be [facility] an [facility]t. She is asking for   anything later than 11:30. Can you please reach back out to her?   ---------------------------------------------------------------------------  --------------  CALL BACK INFO  What is the best way for the office to contact you? OK to leave message on   voicemail  Preferred Call Back Phone Number?  [phone]  ---------------------------------------------------------------------------  --------------  SCRIPT ANSWERS  undefined

## 2022-05-26 NOTE — TELEPHONE ENCOUNTER
Called to advise that is only appointment available at this time. Advised [facility] or [facility].  Will wait for call back

## 2022-06-12 NOTE — PROGRESS NOTES
Subjective:      Patient ID: Sussy Galo 69 y.o. female. is here for evaluation for insect bite. HPI    Is due COVID, shingles and TDAP vaccines. Insect bite:  3 months red blotch on the right shin. Not sore, itchy or getting worse. She thinks it started as a bug bite. Is not getting better or worse.        Lab Results   Component Value Date     04/15/2022    K 4.4 04/15/2022    K 3.6 05/17/2021     04/15/2022    CO2 24 04/15/2022    BUN 16 04/15/2022    CREATININE 0.6 04/15/2022    GLUCOSE 101 04/15/2022    CALCIUM 9.4 04/15/2022       Lab Results   Component Value Date    LABA1C 5.4 04/15/2022     Lab Results   Component Value Date    .3 04/15/2022            No outpatient medications have been marked as taking for the 6/13/22 encounter (Appointment) with Isamar Mayes MD.        Allergies   Allergen Reactions    Other      Butazolidin- pt states was given many yrs ago for varicose veins and pt had adverse reaction       Patient Active Problem List   Diagnosis    Fibromyalgia    Tinnitus    GERD (gastroesophageal reflux disease)    Migraine    Cubital tunnel syndrome    Eyelid dermatitis, eczematous    Osteoporosis    Recurrent UTI    Osteoarthritis, knee    Osteoarthritis of thumb    Trigger finger of right thumb    Ulnar neuropathy of left upper extremity    Pneumonia due to COVID-19 virus       Past Medical History:   Diagnosis Date    Bladder problem     Bleeding disorder ([address]     COVID-19 virus infection 5/13/2021    Fibromyalgia     Migraine     Osteoporosis     Recurrent UTI     Skin lesion of left leg 3/15/2012    Vertigo 10/23/2012       Past Surgical History:   Procedure Laterality Date    APPENDECTOMY  1970    BLADDER SUSPENSION  2/12    Dr. May Mayo    Right lumpectomy    COLON SURGERY  1978    benign large growth    HYSTERECTOMY  2000    Partial    POLYPECTOMY      colon        Family History   Problem Relation Age of Onset    Cancer Mother         skin    Other Mother 80        DVT, PE    High Blood Pressure Father     Stroke Father     Heart Disease Father         MI    High Cholesterol Brother     High Blood Pressure Brother     Asthma Brother        Social History     Tobacco Use    Smoking status: Never Smoker    Smokeless tobacco: Never Used   Substance Use Topics    Alcohol use: No    Drug use: No            Review of Systems  Review of Systems    Objective:   Physical Exam  Vitals:    06/13/22 1136   BP: 124/70   Pulse: 88   Resp: 16   Temp: 97.3 °F (36.3 °C)   SpO2: 98%   Weight: 162 lb 12.8 oz (73.8 kg)       Physical Exam    NAD  Skin is warm and dry. Right lower shin around to lower calf 5 x 8 cm erythema, scaly skin  No induration  PT pulses 2/4  No pedal edema  Assessment:       Diagnosis Orders   1. Eczema, unspecified type  triamcinolone (KENALOG) 0.1 % cream  Side effects of current medications reviewed and questions answered. Call or return to clinic prn if these symptoms worsen or fail to improve as anticipated. 2. Need for COVID-19 vaccine     3. Need for shingles vaccine     4. Need for Tdap vaccination            Plan:      Vaccines encouraged.

## 2022-06-13 ENCOUNTER — OFFICE VISIT (OUTPATIENT)
Dept: FAMILY MEDICINE CLINIC | Age: 77
End: 2022-06-13
Payer: MEDICARE

## 2022-06-13 VITALS
BODY MASS INDEX: 30.78 KG/M2 | RESPIRATION RATE: 16 BRPM | HEART RATE: 88 BPM | WEIGHT: 162.8 LBS | DIASTOLIC BLOOD PRESSURE: 70 MMHG | TEMPERATURE: 97.3 F | OXYGEN SATURATION: 98 % | SYSTOLIC BLOOD PRESSURE: 124 MMHG

## 2022-06-13 DIAGNOSIS — Z23 NEED FOR TDAP VACCINATION: ICD-10-CM

## 2022-06-13 DIAGNOSIS — Z23 NEED FOR SHINGLES VACCINE: ICD-10-CM

## 2022-06-13 DIAGNOSIS — Z23 NEED FOR COVID-19 VACCINE: ICD-10-CM

## 2022-06-13 DIAGNOSIS — L30.9 ECZEMA, UNSPECIFIED TYPE: Primary | ICD-10-CM

## 2022-06-13 PROCEDURE — 1123F ACP DISCUSS/DSCN MKR DOCD: CPT | Performed by: FAMILY MEDICINE

## 2022-06-13 PROCEDURE — 99213 OFFICE O/P EST LOW 20 MIN: CPT | Performed by: FAMILY MEDICINE

## 2022-06-13 RX ORDER — SULFAMETHOXAZOLE AND TRIMETHOPRIM 800; 160 MG/1; MG/1
1 TABLET ORAL 2 TIMES DAILY
Qty: 10 TABLET | Refills: 0 | Status: CANCELLED | OUTPATIENT
Start: 2022-06-13 | End: 2022-06-18

## 2022-06-13 RX ORDER — TRIAMCINOLONE ACETONIDE 1 MG/G
CREAM TOPICAL 3 TIMES DAILY
Qty: 30 G | Refills: 2 | Status: SHIPPED | OUTPATIENT
Start: 2022-06-13

## 2022-06-13 SDOH — ECONOMIC STABILITY: FOOD INSECURITY: WITHIN THE PAST 12 MONTHS, YOU WORRIED THAT YOUR FOOD WOULD RUN OUT BEFORE YOU GOT MONEY TO BUY MORE.: NEVER TRUE

## 2022-06-13 SDOH — ECONOMIC STABILITY: FOOD INSECURITY: WITHIN THE PAST 12 MONTHS, THE FOOD YOU BOUGHT JUST DIDN'T LAST AND YOU DIDN'T HAVE MONEY TO GET MORE.: NEVER TRUE

## 2022-06-13 ASSESSMENT — SOCIAL DETERMINANTS OF HEALTH (SDOH): HOW HARD IS IT FOR YOU TO PAY FOR THE VERY BASICS LIKE FOOD, HOUSING, MEDICAL CARE, AND HEATING?: NOT HARD AT ALL

## 2022-08-07 NOTE — PROGRESS NOTES
Subjective:      Patient ID: Ghada Ruiz 69 y.o. female. is here for evaluation for skin issue lower extremity. HPI    DUE dexa scan. has osteoporosis. DUE: COVID, shingles and TDAP    Has red scaly rash on the right lower leg x 5 months. She thinks it started as a bug bite. Triamacilone cream did not help. Has a dermatologist but does not want to return to him. Pain from behind the left knee to buttock x 6 months. Started when doing yard work. Subsequently has pain from the lower leg radiating up the leg. Sometimes she has pain in the buttock or the left knee. She has no lower back pain and no pain radiating from the lower back to the leg. The knee pain is lateral, shooting - occurs with walking. No swelling . The knee gets stiff. No weakness. Had numbness in the left foot briefly, resolved. The pain sometimes keeps her awake. Heat on the back or side of the knee helps a bit. She takes tylenol one tab + Ibuprofen one tab at [facility]. Helps some. She would like refill for Bactrim. She takes it with her when she goes out of town in case she has UTI. Has not needed it since 2018. Outpatient Medications Marked as Taking for the 8/9/22 encounter (Office Visit) with Trevor Hinojosa MD   Medication Sig Dispense Refill    triamcinolone (KENALOG) 0.1 % cream Apply topically 3 times daily 30 g 2    pregabalin (LYRICA) 25 MG capsule Take 1 capsule by mouth 3 times daily for 90 days. Increase to 50 mg TID in 24 - 48 hours if no pain relief. 90 capsule 2    butalbital-aspirin-caffeine (FIORINAL) -40 MG capsule Take 1 capsule by mouth every 4 hours as needed for Headaches (ear ache.) for up to 24 doses.  24 capsule 0    omeprazole (PRILOSEC) 20 MG delayed release capsule Take 1 capsule by mouth Daily 30 capsule 5    Glucosamine-Chondroit-Vit C-Mn (GLUCOSAMINE CHONDR 500 COMPLEX PO) Take by mouth      vitamin D3 (CHOLECALCIFEROL) 400 units TABS tablet Take 400 Units by mouth daily ibuprofen (ADVIL;MOTRIN) 200 MG tablet Take 200 mg by mouth every 6 hours as needed for Pain      Cranberry 405 MG CAPS Take 1 capsule by mouth daily       b complex vitamins capsule Take 1 capsule by mouth daily.           Allergies   Allergen Reactions    Other      Butazolidin- pt states was given many yrs ago for varicose veins and pt had adverse reaction       Patient Active Problem List   Diagnosis    Fibromyalgia    Tinnitus    GERD (gastroesophageal reflux disease)    Migraine    Cubital tunnel syndrome    Eyelid dermatitis, eczematous    Osteoporosis    Recurrent UTI    Osteoarthritis, knee    Osteoarthritis of thumb    Trigger finger of right thumb    Ulnar neuropathy of left upper extremity    Pneumonia due to COVID-19 virus       Past Medical History:   Diagnosis Date    Bladder problem     [address] ([facility] [address]     COVID-19 virus infection 5/13/2021    Fibromyalgia     Migraine     Osteoporosis     Recurrent UTI     Skin lesion of left leg 3/15/2012    Vertigo 10/23/2012       Past Surgical History:   Procedure Laterality Date    APPENDECTOMY  1970    BLADDER SUSPENSION  2/12    Dr. Dunia Felton    Right lumpectomy    COLON SURGERY  1978    benign large growth    HYSTERECTOMY (CERVIX STATUS UNKNOWN)  2000    Partial    POLYPECTOMY      colon        Family History   Problem Relation Age of Onset    Cancer Mother         skin    Other Mother 80        DVT, PE    High Blood Pressure Father     Stroke Father     Heart Disease Father         MI    High Cholesterol Brother     High Blood Pressure Brother     Asthma Brother        Social History     Tobacco Use    Smoking status: Never    Smokeless tobacco: Never   Substance Use Topics    Alcohol use: No    Drug use: No            Review of Systems  Review of Systems    Objective:   Physical Exam  Vitals:    08/09/22 1211   BP: 120/80   Pulse: 67   Resp: 14   Temp: 97.1 °F (36.2 °C)   TempSrc: Temporal   SpO2: 98%   Weight: 164 lb 9.6 oz (74.7 kg)   Height: 5' 3\" (1.6 m)       Physical Exam  Constitutional:       General: She is not in acute distress. Appearance: She is well-developed. Cardiovascular:      Rate and Rhythm: Normal rate and regular rhythm. Heart sounds: Normal heart sounds. Pulmonary:      Effort: Pulmonary effort is normal.      Breath sounds: Normal breath sounds. Musculoskeletal:      Lumbar back: No swelling, edema, deformity, spasms, tenderness or bony tenderness. Normal range of motion. Negative right straight leg raise test and negative left straight leg raise test.      Right hip: Normal.      Left hip: Normal.      Right upper leg: Normal.      Left upper leg: Normal.      Right knee: Normal.      Left knee: Deformity present. No swelling, effusion, erythema, ecchymosis, lacerations or bony tenderness. Normal range of motion. Tenderness present over the lateral joint line. No medial joint line, MCL, LCL, ACL, PCL or patellar tendon tenderness. Normal pulse. Skin:     General: Skin is warm and dry. Findings: Rash present. Rash is not macular or papular. Neurological:      Motor: No weakness or atrophy. Gait: Gait abnormal.      Deep Tendon Reflexes:      Reflex Scores:       Patellar reflexes are 2+ on the right side and 2+ on the left side. Achilles reflexes are 2+ on the right side and 2+ on the left side. Comments: Gait antalgic left. Motor 5/5 hip flexors, flexion and extension knees, dorsiflexion and plantar flexion feet. DTR 2/4 patella and Achilles tendons. Assessment:       Diagnosis Orders   1.  Jorge Vaughan MD, Dermatology, [facility]  Not sure why it has not responded to steroid cream.        2. Gastroesophageal reflux disease, unspecified whether esophagitis present  omeprazole (PRILOSEC) 20 MG delayed release capsule              4. Acute cystitis without hematuria  sulfamethoxazole-trimethoprim (BACTRIM DS;SEPTRA DS) 800-160 MG per tablet      5. Primary osteoarthritis of left knee  celecoxib (CELEBREX) 200 MG capsule    Tawana Guerra MD, Orthopedic Surgery, [facility]    I think the knee explains the pain in the entire leg. Plan:      Side effects of current medications reviewed and questions answered. Call or return to clinic prn if these symptoms worsen or fail to improve as anticipated.

## 2022-08-09 ENCOUNTER — OFFICE VISIT (OUTPATIENT)
Dept: FAMILY MEDICINE CLINIC | Age: 77
End: 2022-08-09
Payer: MEDICARE

## 2022-08-09 VITALS
WEIGHT: 164.6 LBS | TEMPERATURE: 97.1 F | HEIGHT: 63 IN | HEART RATE: 67 BPM | OXYGEN SATURATION: 98 % | SYSTOLIC BLOOD PRESSURE: 120 MMHG | BODY MASS INDEX: 29.16 KG/M2 | RESPIRATION RATE: 14 BRPM | DIASTOLIC BLOOD PRESSURE: 80 MMHG

## 2022-08-09 DIAGNOSIS — N30.00 ACUTE CYSTITIS WITHOUT HEMATURIA: ICD-10-CM

## 2022-08-09 DIAGNOSIS — K21.9 GASTROESOPHAGEAL REFLUX DISEASE, UNSPECIFIED WHETHER ESOPHAGITIS PRESENT: ICD-10-CM

## 2022-08-09 DIAGNOSIS — L30.9 DERMATITIS: ICD-10-CM

## 2022-08-09 DIAGNOSIS — M17.12 PRIMARY OSTEOARTHRITIS OF LEFT KNEE: Primary | ICD-10-CM

## 2022-08-09 PROCEDURE — 99214 OFFICE O/P EST MOD 30 MIN: CPT | Performed by: FAMILY MEDICINE

## 2022-08-09 PROCEDURE — 1123F ACP DISCUSS/DSCN MKR DOCD: CPT | Performed by: FAMILY MEDICINE

## 2022-08-09 RX ORDER — CELECOXIB 200 MG/1
200 CAPSULE ORAL DAILY
Qty: 30 CAPSULE | Refills: 5 | Status: SHIPPED | OUTPATIENT
Start: 2022-08-09 | End: 2022-08-24

## 2022-08-09 RX ORDER — OMEPRAZOLE 20 MG/1
20 CAPSULE, DELAYED RELEASE ORAL DAILY
Qty: 30 CAPSULE | Refills: 5 | Status: SHIPPED | OUTPATIENT
Start: 2022-08-09

## 2022-08-09 RX ORDER — SULFAMETHOXAZOLE AND TRIMETHOPRIM 800; 160 MG/1; MG/1
1 TABLET ORAL 2 TIMES DAILY
Qty: 10 TABLET | Refills: 0 | Status: SHIPPED | OUTPATIENT
Start: 2022-08-09 | End: 2022-08-14

## 2022-08-13 ENCOUNTER — TELEPHONE (OUTPATIENT)
Dept: FAMILY MEDICINE CLINIC | Age: 77
End: 2022-08-13

## 2022-08-15 NOTE — TELEPHONE ENCOUNTER
Lashawn tested pos. For covid over the weekend, last time she had this was hospitalized for a week. Paxlovid sent in. Doing well at this time. Advised to call if symptoms without improvement.

## 2022-08-22 ENCOUNTER — OFFICE VISIT (OUTPATIENT)
Dept: ORTHOPEDIC SURGERY | Age: 77
End: 2022-08-22
Payer: MEDICARE

## 2022-08-22 VITALS — BODY MASS INDEX: 29.06 KG/M2 | WEIGHT: 164 LBS | HEIGHT: 63 IN

## 2022-08-22 DIAGNOSIS — M25.562 LEFT KNEE PAIN, UNSPECIFIED CHRONICITY: Primary | ICD-10-CM

## 2022-08-22 DIAGNOSIS — M25.561 RIGHT KNEE PAIN, UNSPECIFIED CHRONICITY: ICD-10-CM

## 2022-08-22 PROCEDURE — 1123F ACP DISCUSS/DSCN MKR DOCD: CPT | Performed by: ORTHOPAEDIC SURGERY

## 2022-08-22 PROCEDURE — 99204 OFFICE O/P NEW MOD 45 MIN: CPT | Performed by: ORTHOPAEDIC SURGERY

## 2022-08-22 PROCEDURE — 20610 DRAIN/INJ JOINT/BURSA W/O US: CPT | Performed by: ORTHOPAEDIC SURGERY

## 2022-08-22 RX ORDER — METHYLPREDNISOLONE ACETATE 40 MG/ML
40 INJECTION, SUSPENSION INTRA-ARTICULAR; INTRALESIONAL; INTRAMUSCULAR; SOFT TISSUE ONCE
Status: COMPLETED | OUTPATIENT
Start: 2022-08-22 | End: 2022-08-22

## 2022-08-22 RX ORDER — BUPIVACAINE HYDROCHLORIDE 5 MG/ML
4 INJECTION, SOLUTION PERINEURAL ONCE
Status: COMPLETED | OUTPATIENT
Start: 2022-08-22 | End: 2022-08-22

## 2022-08-22 RX ADMIN — BUPIVACAINE HYDROCHLORIDE 20 MG: 5 INJECTION, SOLUTION PERINEURAL at 11:20

## 2022-08-22 RX ADMIN — METHYLPREDNISOLONE ACETATE 40 MG: 40 INJECTION, SUSPENSION INTRA-ARTICULAR; INTRALESIONAL; INTRAMUSCULAR; SOFT TISSUE at 11:21

## 2022-08-22 RX ADMIN — BUPIVACAINE HYDROCHLORIDE 20 MG: 5 INJECTION, SOLUTION PERINEURAL at 11:21

## 2022-08-22 NOTE — Clinical Note
Dear Dr. Scarlett Foy,  Thank you very much for the referral and allowing me to participate in this patient's care. Please see the attached note for full details of the visit.   With kind regards, Mckenzie Caro MD

## 2022-08-22 NOTE — PROGRESS NOTES
8/22/2022     Reason for visit:  Bilateral knee pain    History of Present Illness: The patient's is a 61-year-old female who presents for evaluation of her bilateral knees. She reports on and off pain for several years with recent worsening. No recent traumatic injury. She localized the pain predominately to the anterior and deep aspect of the knee. Is made worse with standing, walking, stairs. Occasional swelling. No catching or locking.     Medical History:  Past Medical History:   Diagnosis Date    Bladder problem     Bleeding disorder ([address]     COVID-19 virus infection 5/13/2021    Fibromyalgia     Migraine     Osteoporosis     Recurrent UTI     Skin lesion of left leg 3/15/2012    Vertigo 10/23/2012      Past Surgical History:   Procedure Laterality Date    APPENDECTOMY  1970    BLADDER SUSPENSION  2/12    Dr. Filiberto Wilson    Right lumpectomy    [address]    benign large growth    HYSTERECTOMY (CERVIX STATUS UNKNOWN)  2000    Partial    POLYPECTOMY      colon      Family History   Problem Relation Age of Onset    Cancer Mother         skin    Other Mother 80        DVT, PE    High Blood Pressure Father     Stroke Father     Heart Disease Father         MI    High Cholesterol Brother     High Blood Pressure Brother     Asthma Brother       Social History     Socioeconomic History    Marital status:      Spouse name: Not on file    Number of children: 2    Years of education: Not on file    Highest education level: Not on file   Occupational History    Occupation: Music teacher2   Tobacco Use    Smoking status: Never    Smokeless tobacco: Never   Substance and Sexual Activity    Alcohol use: No    Drug use: No    Sexual activity: Yes     Partners: Male   Other Topics Concern    Not on file   Social History Narrative    Not on file     Social Determinants of Health     Financial Resource Strain: Low Risk     Difficulty of Paying Living Expenses: Not hard at all   Food Insecurity: No Food Insecurity    Worried About Running Out of Food in the Last Year: Never true    Ran Out of Food in the Last Year: Never true   Transportation Needs: Not on file   Physical Activity: Not on file   Stress: Not on file   Social Connections: Not on file   Intimate Partner Violence: Not on file   Housing Stability: Not on file      Current Outpatient Medications on File Prior to Visit   Medication Sig Dispense Refill    omeprazole (PRILOSEC) 20 MG delayed release capsule Take 1 capsule by mouth in the morning. 30 capsule 5    celecoxib (CELEBREX) 200 MG capsule Take 1 capsule by mouth in the morning. 30 capsule 5    triamcinolone (KENALOG) 0.1 % cream Apply topically 3 times daily 30 g 2    pregabalin (LYRICA) 25 MG capsule Take 1 capsule by mouth 3 times daily for 90 days. Increase to 50 mg TID in 24 - 48 hours if no pain relief. 90 capsule 2    butalbital-aspirin-caffeine (FIORINAL) -40 MG capsule Take 1 capsule by mouth every 4 hours as needed for Headaches (ear ache.) for up to 24 doses. 24 capsule 0    Glucosamine-Chondroit-Vit C-Mn (GLUCOSAMINE CHONDR 500 COMPLEX PO) Take by mouth      vitamin D3 (CHOLECALCIFEROL) 400 units TABS tablet Take 400 Units by mouth daily      ibuprofen (ADVIL;MOTRIN) 200 MG tablet Take 200 mg by mouth every 6 hours as needed for Pain      Cranberry 405 MG CAPS Take 1 capsule by mouth daily       b complex vitamins capsule Take 1 capsule by mouth daily. No current facility-administered medications on file prior to visit. Allergies   Allergen Reactions    Other      Butazolidin- pt states was given many yrs ago for varicose veins and pt had adverse reaction        Review of Systems:  Constitutional: Patient is adequately groomed with no evidence of malnutrition  Mental Status: The patient is oriented to time, place and person. The patient's mood and affect are appropriate.   Lymphatic: The lymphatic examination bilaterally reveals all areas to be without enlargement or induration. Vascular: Examination reveals no swelling or calf tenderness. Peripheral pulses are palpable and 2+. Neurological: The patient has good coordination. There is no weakness or sensory deficit. Skin:  Head/Neck: inspection reveals no rashes, ulcerations or lesions. Trunk: inspection reveals no rashes, ulcerations or lesions. Objective:  Ht 5' 3\" (1.6 m)   Wt 164 lb (74.4 kg)   BMI 29.05 kg/m²      Physical Exam:  The patient is well-appearing and in no apparent distress  Examination of the right and left knee   There is a small effusion, no gross deformity or skin changes  Range of motion reveals 0 to 130  Neg lachman, negative posterior drawer, no pain or laxity with varus or valgus stress at 0 degrees and 30 degrees of flexion  + joint line tenderness  5 out of 5 strength throughout distal muscle groups  Sensation is intact to light touch throughout all distributions  There is no calf swelling or tenderness  Palpable DP pulse, brisk cap refill, 2+ symmetric reflexes     Imagin view x-rays of the left and right knees were obtained in the office today on 2022 and reviewed. There is no fracture or dislocation. Tricompartmental degenerative changes are present with decreased joint space and osteophyte formation predominate involving the patellofemoral joint. Assessment:  Bilateral knee degenerative joint disease    Plan:  I discussed with the patient the diagnosis and treatment options. We discussed operative and nonoperative management. At this point I do recommend nonoperative management. Nonoperative treatment options include activity modification, anti-inflammatory medications, physical therapy, and injections. The patient elected proceed with cortisone injection. Therefore injection was given to the right and left knees via sterile technique. The injection consisted of 40 mg of Depo-Medrol combined with 4 mL of 0.5% Marcaine.   The patient tolerated this well. The patient return as needed. In future we can repeat cortisone or alternatively consider hyaluronic acid injection. The patient may also be a candidate for total knee arthroplasty at some point in future    Greater than 45 minutes were spent with this encounter. Time spent included evaluating the patient's chart prior to arrival.  Evaluating the patient in the office including history, physical examination, imaging reviewing, and counseling on next steps. Lastly, time was spent discussing orders with my staff as well as providing documentation in the chart. Marlena Mcgrath MD            Orthopaedic Surgery Sports Medicine and [facility] and [facility] Physician [facility] ([place]      Disclaimer: This note was dictated with voice recognition software. Though review and correction are routine, we apologize for any errors.

## 2022-08-24 ENCOUNTER — OFFICE VISIT (OUTPATIENT)
Dept: DERMATOLOGY | Age: 77
End: 2022-08-24
Payer: MEDICARE

## 2022-08-24 DIAGNOSIS — R21 RASH AND OTHER NONSPECIFIC SKIN ERUPTION: Primary | ICD-10-CM

## 2022-08-24 DIAGNOSIS — I87.2 STASIS DERMATITIS OF BOTH LEGS: ICD-10-CM

## 2022-08-24 PROCEDURE — 99204 OFFICE O/P NEW MOD 45 MIN: CPT | Performed by: INTERNAL MEDICINE

## 2022-08-24 PROCEDURE — 1123F ACP DISCUSS/DSCN MKR DOCD: CPT | Performed by: INTERNAL MEDICINE

## 2022-08-24 NOTE — PROGRESS NOTES
[facility]  Lin Guerin MD  [phone]    Date of Visit: 8/24/2022    Moe Childers is a 69 y.o. female who presents for skin rashes. New pt    Chief Complaint:   Chief Complaint   Patient presents with    Rash     Both arms and a possible bug bit lower rt leg         History of Present Illness:    Concern: Rash on arms  Duration: 1 month  Symptoms: Itchy  Previous treatments:   -Aveeno gel  Effect of trmt: Improved  Other hx: Probably worse with sun, hard to say    Concerns: Rash on R medial lower leg  Duration: Many months  Sx: None, bothered by discoloration  Previous trmt: Triamcinolone--no improvement       Review of Systems:  Gen: Feels well, good sense of health. Skin: No new or changing moles, no history of keloids or hypertrophic scars. Heme: No abnormal bruising or bleeding. Past Medical History, Family History, Surgical History, Medications and Allergies reviewed. Past Medical History:   Diagnosis Date    Bladder problem     Bleeding disorder ([address]     COVID-19 virus infection 5/13/2021    Fibromyalgia     Migraine     Osteoporosis     Recurrent UTI     Skin lesion of left leg 3/15/2012    Vertigo 10/23/2012     Past Surgical History:   Procedure Laterality Date    APPENDECTOMY  1970    BLADDER SUSPENSION  2/12    Dr. Celestino Centeno    Right lumpectomy    [address]    benign large growth    HYSTERECTOMY (CERVIX STATUS UNKNOWN)  2000    Partial    POLYPECTOMY      colon       Allergies   Allergen Reactions    Other      Butazolidin- pt states was given many yrs ago for varicose veins and pt had adverse reaction     Outpatient Medications Marked as Taking for the 8/24/22 encounter (Office Visit) with Dewaine Litten, MD   Medication Sig Dispense Refill    omeprazole (PRILOSEC) 20 MG delayed release capsule Take 1 capsule by mouth in the morning.  30 capsule 5    triamcinolone (KENALOG) 0.1 % cream Apply topically 3 times daily 30 g 2 butalbital-aspirin-caffeine (FIORINAL) -40 MG capsule Take 1 capsule by mouth every 4 hours as needed for Headaches (ear ache.) for up to 24 doses. 24 capsule 0    Glucosamine-Chondroit-Vit C-Mn (GLUCOSAMINE CHONDR 500 COMPLEX PO) Take by mouth      vitamin D3 (CHOLECALCIFEROL) 400 units TABS tablet Take 400 Units by mouth daily      ibuprofen (ADVIL;MOTRIN) 200 MG tablet Take 200 mg by mouth every 6 hours as needed for Pain      Cranberry 405 MG CAPS Take 1 capsule by mouth daily       b complex vitamins capsule Take 1 capsule by mouth daily. Physical Examination     Visible skin exam was conducted to include the scalp, face, lips, lids/conjunctiva, ears, neck, right and left hands and forearms and bilateral lower legs between knees and ankles was normal with the following exceptions:   -Resolving pink plaques on bilateral arms   -Pink to brown patch on R medial lower leg with scattered brown macules above   -Varicose veins on bilateral lower legs, mild leg swelling on bilateral legs      Assessment and Plan     1. Rash and other nonspecific skin eruption, bilateral arms--not controlled  - Favor eczematous process, possible photosensitive eruption given distribution below sleeves  - Pt says much improved from prior and defers biopsy for now  -Recommend starting triamcinolone ointment BID PRN for arm rash until resolves and to RTC if not improved in next few weeks    2. Stasis dermatitis of both legs, R medial lower leg--not controlled  - Stop triamcinolone for now given not symptomatic for patient  -Start daily compression stockings and leg elevation     Return in about 3 months (around 11/24/2022). Note is transcribed using voice recognition software. Inadvertent computerized transcription errors may be present.     Tre Miner MD

## 2022-08-24 NOTE — PATIENT INSTRUCTIONS
Thank you for visiting [facility] today! Please follow the instructions below as we discussed in clinic:      Start using triamcinolone ointment twice a day to rash on arms  Start wearing compression stockings on your legs daily. Elevate your legs daily     BLAND SKIN CARE LIST    Inflammatory skin diseases can flare and become irritated due to fragrances and preservatives found in personal care products. Please try to switch your personal care products to ones found on the list below. These products are found online ([email or web address]) and often at [facility] or [facility]. Shampoos: Free and Clear Shampoo, VMV Hypoallergenics [employer] Wash Hair + Body \"Big Softie\" Shampoo  Lotions: Aveeno Eczema Therapy Daily Moisturizing Cream, Cerave Moisturizing Cream, Cetaphil Hydrating Lotion, Vaseline   Laundry Detergent:  All Free Clear Detergent Powder, Tide Free & Gentle Powdered Detergent, 7th Generation Laundry Detergent  Face wash: Cerave Hydrating Facial Cleanser, Cetaphil Gentle Skin Cleanser,   Facial moisturizers: Aveeno Daily Moisturizing Lotion with Sunscreen SPF 15, Cetaphil Daily Facial Moisturizer, Cerave AM Facial Moisturizing Lotion  Sunscreen: Vanicream sunscreen, La Roche-Posay Toleriane Double Repair Face Moisturizer SPF 30  Deodorant: Vanicream deodorant, Crystal body deodorant stick  Make-up: Bare Minerals

## 2022-09-08 ENCOUNTER — TELEPHONE (OUTPATIENT)
Dept: FAMILY MEDICINE CLINIC | Age: 77
End: 2022-09-08

## 2022-09-08 DIAGNOSIS — K21.9 GASTROESOPHAGEAL REFLUX DISEASE, UNSPECIFIED WHETHER ESOPHAGITIS PRESENT: Primary | ICD-10-CM

## 2022-09-08 NOTE — TELEPHONE ENCOUNTER
----- Message from Colonel Arauz sent at 9/8/2022 11:00 AM EDT -----  Subject: Referral Request    Reason for referral request? Endoscopy - with Dr. Albert Ingram   Provider patient wants to be referred to(if known):     Provider Phone Number(if known): Additional Information for Provider? Patient would like a text message.    ---------------------------------------------------------------------------  --------------  Tangela [facility]    [phone]; Do not leave any message, patient will call back for answer  ---------------------------------------------------------------------------  --------------

## 2022-09-12 ENCOUNTER — TELEPHONE (OUTPATIENT)
Dept: FAMILY MEDICINE CLINIC | Age: 77
End: 2022-09-12

## 2022-09-12 NOTE — TELEPHONE ENCOUNTER
Endoscopy is for Trouble swallowing having a lot of indigestion. Ml Berry [phone] colonoscopy needs to be done at the [facility] so she can have a [facility] done before. Patient is wanting a colonoscopy because she has a   lot of pain in her lower abdomen and had blood and mucous in her stool a   couple times.        The previous message made no sense and was backwards. I spoke with the pt to get this information. If this is confusing then the pt would just like to get the colonscopy done first then she will get the endoscopy done.     Thank you

## 2022-09-12 NOTE — TELEPHONE ENCOUNTER
----- Message from Semaj Montiel sent at 9/12/2022  8:34 AM EDT -----  Subject: Message to Provider    QUESTIONS  Information for Provider? Patient is wanting a endoscopy because she has a   lot of pain in her lower abdomen and had blood and mucous in her stool a   couple times. ---------------------------------------------------------------------------  --------------  Kamla ROCK  [phone]; OK to leave message on voicemail  ---------------------------------------------------------------------------  --------------  SCRIPT ANSWERS  Relationship to Patient?  Self

## 2022-09-12 NOTE — TELEPHONE ENCOUNTER
----- Message from Claudia Espino sent at 9/12/2022  8:36 AM EDT -----  Subject: Message to Provider    QUESTIONS  Information for Provider? For the colonoscopy the patient is wanting to go   to the [facility]  ---------------------------------------------------------------------------  --------------  [address] [employer]  [phone]; OK to leave message on voicemail  ---------------------------------------------------------------------------  --------------  SCRIPT ANSWERS  Relationship to Patient?  Self

## 2022-09-12 NOTE — TELEPHONE ENCOUNTER
----- Message from Helene Puri sent at 9/12/2022  8:35 AM EDT -----  Subject: Referral Request    Reason for referral request? Colonoscopy   Provider patient wants to be referred to(if known):     Provider Phone Number(if known): Additional Information for Provider?  Hasn't had one in 20 years, need   appointment before noon or a Friday  ---------------------------------------------------------------------------  --------------  [address]    [phone]; OK to leave message on voicemail  ---------------------------------------------------------------------------  --------------

## 2022-11-16 ENCOUNTER — OFFICE VISIT (OUTPATIENT)
Dept: DERMATOLOGY | Age: 77
End: 2022-11-16
Payer: MEDICARE

## 2022-11-16 DIAGNOSIS — I87.2 STASIS DERMATITIS OF BOTH LEGS: Primary | ICD-10-CM

## 2022-11-16 PROCEDURE — 1123F ACP DISCUSS/DSCN MKR DOCD: CPT | Performed by: INTERNAL MEDICINE

## 2022-11-16 PROCEDURE — 99213 OFFICE O/P EST LOW 20 MIN: CPT | Performed by: INTERNAL MEDICINE

## 2022-11-16 NOTE — PROGRESS NOTES
[address] [facility]  Naseem Cobian MD  [phone]    Date of Visit: 11/16/2022  LV 8/2022    Chico Villarreal is a 69 y.o. female who presents for skin rash f/u    Chief Complaint:   Chief Complaint   Patient presents with    Other     Spots on legs        History of Present Illness:    Concerns: Stasis dermatitis on R medial lower leg  Duration: Many months  Sx: None, bothered by discoloration  Previous trmt: Triamcinolone, Aveeno cream  Effect of trmt: Improved, but not resolved   Other hx: Had vein stripping in past    Concern: Rash on arms c/w ACD vs. Photosensitive eruption last visit   Duration: Summer 2022  Symptoms: Itchy  Previous treatments:   -Aveeno gel  -Triamcinolone ointment   Effect of trmt: Resolved      Review of Systems:  Gen: Feels well, good sense of health. Skin: No new or changing moles, no history of keloids or hypertrophic scars. Heme: No abnormal bruising or bleeding. Past Medical History, Family History, Surgical History, Medications and Allergies reviewed. Past Medical History:   Diagnosis Date    Bladder problem     Bleeding disorder ([address]     COVID-19 virus infection 5/13/2021    Fibromyalgia     Migraine     Osteoporosis     Recurrent UTI     Skin lesion of left leg 3/15/2012    Vertigo 10/23/2012     Past Surgical History:   Procedure Laterality Date    APPENDECTOMY  1970    BLADDER SUSPENSION  2/12    Dr. Hayden Tafoya    Right lumpectomy    [address]    benign large growth    HYSTERECTOMY (CERVIX STATUS UNKNOWN)  2000    Partial    POLYPECTOMY      colon       Allergies   Allergen Reactions    Other      Butazolidin- pt states was given many yrs ago for varicose veins and pt had adverse reaction     Outpatient Medications Marked as Taking for the 11/16/22 encounter (Office Visit) with Alf Strange MD   Medication Sig Dispense Refill    omeprazole (PRILOSEC) 20 MG delayed release capsule Take 1 capsule by mouth in the morning. 30 capsule 5    triamcinolone (KENALOG) 0.1 % cream Apply topically 3 times daily 30 g 2    Glucosamine-Chondroit-Vit C-Mn (GLUCOSAMINE CHONDR 500 COMPLEX PO) Take by mouth      vitamin D3 (CHOLECALCIFEROL) 400 units TABS tablet Take 400 Units by mouth daily      ibuprofen (ADVIL;MOTRIN) 200 MG tablet Take 200 mg by mouth every 6 hours as needed for Pain      Cranberry 405 MG CAPS Take 1 capsule by mouth daily       b complex vitamins capsule Take 1 capsule by mouth daily. Physical Examination     Limited  skin exam was conducted to include the  face around mask, lids/conjunctiva, ears, neck, right and left hands and forearms and bilateral lower legs between knees and ankles was normal with the following exceptions:   -Pink patch on R medial lower leg that is less inflamed than last visit, no ulceration or surface changes   -Varicose veins on bilateral lower legs, mild leg swelling on bilateral legs      Assessment and Plan       1. Stasis dermatitis of both legs, R medial lower leg > L--not controlled, but improving  -Cont compression stockings--try to wear more during day with strength at least of 20mmHg  -Cont TMC oint BID PRN for 2 weeks per [place] if itchy    RTC 4 months     Note is transcribed using voice recognition software. Inadvertent computerized transcription errors may be present.     Bree Elena MD

## 2022-11-16 NOTE — PATIENT INSTRUCTIONS
Thank you for visiting [facility] today! Please follow the instructions below as we discussed in clinic:      Start using triamcinolone ointment twice a day to rash on right leg for 2 week per month  Continue wearing compression stockings on your legs daily; 20mmHg is best. Elevate your legs daily     BLAND SKIN CARE LIST    Inflammatory skin diseases can flare and become irritated due to fragrances and preservatives found in personal care products. Please try to switch your personal care products to ones found on the list below. These products are found online ([employer]) and often at [facility] or [address] [place] [address]. Shampoos: Free and Clear Shampoo, VMV Hypoallergenics [employer] Wash Hair + Body \"Big Softie\" Shampoo  Lotions: Aveeno Eczema Therapy Daily Moisturizing Cream, Cerave Moisturizing Cream, Cetaphil Hydrating Lotion, Vaseline   Laundry Detergent:  All Free Clear Detergent Powder, Tide Free & Gentle Powdered Detergent, 7th Generation Laundry Detergent  Face wash: Cerave Hydrating Facial Cleanser, Cetaphil Gentle Skin Cleanser,   Facial moisturizers: Aveeno Daily Moisturizing Lotion with Sunscreen SPF 15, Cetaphil Daily Facial Moisturizer, Cerave AM Facial Moisturizing Lotion  Sunscreen: Vanicream sunscreen, La Roche-Posay Toleriane Double Repair Face Moisturizer SPF 30  Deodorant: Vanicream deodorant, Crystal body deodorant stick  Make-up: Bare Minerals

## 2022-11-18 ENCOUNTER — TELEPHONE (OUTPATIENT)
Dept: FAMILY MEDICINE CLINIC | Age: 77
End: 2022-11-18

## 2022-11-18 NOTE — TELEPHONE ENCOUNTER
Patient want to be seen today . She has extreme ear pain. No appts available.  Please call patient at [phone]

## 2023-01-08 PROBLEM — U07.1 PNEUMONIA DUE TO COVID-19 VIRUS: Status: RESOLVED | Noted: 2021-05-15 | Resolved: 2023-01-08

## 2023-01-08 PROBLEM — J12.82 PNEUMONIA DUE TO COVID-19 VIRUS: Status: RESOLVED | Noted: 2021-05-15 | Resolved: 2023-01-08

## 2023-01-09 NOTE — PROGRESS NOTES
Subjective:      Patient ID: Warden Drake 69 y.o. female. The primary encounter diagnosis was Fibromyalgia. Diagnoses of Recurrent UTI, Need for vaccination, Migraine without aura and without status migrainosus, not intractable, Asymptomatic postmenopausal estrogen deficiency, and Irritable bowel syndrome with diarrhea were also pertinent to this visit. HPI    DUE COVID, TDAP, shingles and flu vaccines. Is considering Shingrix. DUE Dexa scan - will consider. Risk of fracture addressed. Had osteoporosis on dexa scan in 2011. IBS-D for many years. Typically controlled with Imodium a few times. Yesterday she had more cramping than typical.  No fever, nausea, vomiting, melena, hematochezia. She does not think she has ever been screened for celiac. Her last colonoscopy was 20 years ago. She had a negative Cologuard in 2019 and negative FIT tests last year. She had a negative pancreatic elastace stool test last year. Recurrent UTI:  treated with increase fluids. No UTI x one year. No problems since eats an apple every day. Also helps to prevent reflux; has been able to stop Zantac. Fibromyalgia. TODAY:  injections in her knees helped. Most of the pain is in her legs. Is not doing any exercise.           Lab Results   Component Value Date     04/15/2022    K 4.4 04/15/2022     04/15/2022    CO2 24 04/15/2022    BUN 16 04/15/2022    CREATININE 0.6 04/15/2022    GLUCOSE 101 (H) 04/15/2022    CALCIUM 9.4 04/15/2022    PROT 6.7 04/15/2022    LABALBU 4.5 04/15/2022    BILITOT <0.2 04/15/2022    ALKPHOS 93 04/15/2022    AST 24 04/15/2022    ALT 20 04/15/2022    LABGLOM >60 04/15/2022    GFRAA >60 04/15/2022    AGRATIO 2.0 04/15/2022    GLOB 3 07/30/2013        Lab Results   Component Value Date    WBC 5.9 04/15/2022    HGB 13.4 04/15/2022    HCT 39.5 04/15/2022    MCV 93.1 04/15/2022     04/15/2022     TSH   Date Value Ref Range Status   04/15/2022 1.25 0.27 - 4.20 uIU/mL Final 05/06/2019 1.740 0.270 - 4.200 mcIU/mL Final     Comment:     (NOTE)  Ingestion of reed doses of biotin (>5 mg/day) taken within 8 hours of  drawing blood sample can interfere with this immunoassay test.     12/20/2011 1.53 0.35 - 5.5 uIU/ml Final       Outpatient Medications Marked as Taking for the 1/12/23 encounter (Office Visit) with Sheron Wynne MD   Medication Sig Dispense Refill    omeprazole (PRILOSEC) 20 MG delayed release capsule Take 1 capsule by mouth in the morning. 30 capsule 5    triamcinolone (KENALOG) 0.1 % cream Apply topically 3 times daily 30 g 2    butalbital-aspirin-caffeine (FIORINAL) -40 MG capsule Take 1 capsule by mouth every 4 hours as needed for Headaches (ear ache.) for up to 24 doses. 24 capsule 0    Glucosamine-Chondroit-Vit C-Mn (GLUCOSAMINE CHONDR 500 COMPLEX PO) Take by mouth      vitamin D3 (CHOLECALCIFEROL) 400 units TABS tablet Take 400 Units by mouth daily      ibuprofen (ADVIL;MOTRIN) 200 MG tablet Take 200 mg by mouth every 6 hours as needed for Pain      Cranberry 405 MG CAPS Take 1 capsule by mouth daily       b complex vitamins capsule Take 1 capsule by mouth daily.           Allergies   Allergen Reactions    Other      Butazolidin- pt states was given many yrs ago for varicose veins and pt had adverse reaction       Patient Active Problem List   Diagnosis    Fibromyalgia    Tinnitus    GERD (gastroesophageal reflux disease)    Migraine    Eyelid dermatitis, eczematous    Osteoporosis    Recurrent UTI    Osteoarthritis, knee    Osteoarthritis of thumb    Trigger finger of right thumb       Past Medical History:   Diagnosis Date    Bladder problem     [address] [address]     COVID-19 virus infection 5/13/2021    Cubital tunnel syndrome 12/7/2011    Fibromyalgia     Migraine     Osteoporosis     Pneumonia due to COVID-19 virus 5/15/2021    Recurrent UTI     Skin lesion of left leg 3/15/2012    Ulnar neuropathy of left upper extremity 10/6/2016    Vertigo 10/23/2012       Past Surgical History:   Procedure Laterality Date    APPENDECTOMY  1970    BLADDER SUSPENSION  2/12    Dr. Fransisco Hopkins    Right lumpectomy    [address]    benign large growth    HYSTERECTOMY (CERVIX STATUS UNKNOWN)  2000    Partial    POLYPECTOMY      colon        Family History   Problem Relation Age of Onset    Cancer Mother         skin    Other Mother 80        DVT, PE    High Blood Pressure Father     Stroke Father     Heart Disease Father         MI    High Cholesterol Brother     High Blood Pressure Brother     Asthma Brother        Social History     Tobacco Use    Smoking status: Never    Smokeless tobacco: Never   Substance Use Topics    Alcohol use: No    Drug use: No            Review of Systems  Review of Systems    Objective:   Physical Exam  Vitals:    01/12/23 0809   BP: 114/60   Pulse: 77   Resp: 14   Temp: 98.3 °F (36.8 °C)   TempSrc: Temporal   SpO2: 98%   Weight: 159 lb (72.1 kg)     Wt Readings from Last 3 Encounters:   01/12/23 159 lb (72.1 kg)   08/22/22 164 lb (74.4 kg)   08/09/22 164 lb 9.6 oz (74.7 kg)        Physical Exam  NAD    Skin is warm and dry. No rash. Well hydrated  Alert and oriented x 3. Mood and affect are normal.  The neck is supple and free of adenopathy or masses, the thyroid is normal without enlargement or nodules. Chest: clear with no wheezes or rales. No retractions, or use of accessory muscles noted. Cardiovascular: PMI is not displaced, and no thrill noted. Regular rate and rhythm with no rub, murmur or gallop. No peripheral edema. The abdomen is soft without tenderness, guarding, mass, rebound or organomegaly. Aorta, femoral, DP and PT pulses intact. Assessment:       Diagnosis Orders   1. Age-related osteoporosis without current pathological fracture  Risk of fracture and loss of independence addressed. She declines dexa scan today. She takes Ca and D      2.  Irritable bowel syndrome with diarrhea Hyoscyamine Sulfate SL (LEVSIN/SL) 0.125 MG SUBL    AGNES Ozuna MD, Gastroenterology, [facility]  Diet addressed       3. Fibromyalgia  Exercise encouraged      4. Need for vaccination  Addressed       5. Colon cancer screening  AGNES Ozuna MD, Gastroenterology, [facility]      6. Diarrhea, unspecified type  Celiac Screen with Reflex    CBC with Auto Differential    Comprehensive Metabolic Panel    Likely IBS but rule out celiac and with worsening cramps will schedule colonoscopy              Plan:      Side effects of current medications reviewed and questions answered. Follow up in 6 months or prn.

## 2023-01-12 ENCOUNTER — OFFICE VISIT (OUTPATIENT)
Dept: FAMILY MEDICINE CLINIC | Age: 78
End: 2023-01-12

## 2023-01-12 VITALS
BODY MASS INDEX: 28.17 KG/M2 | HEART RATE: 77 BPM | WEIGHT: 159 LBS | DIASTOLIC BLOOD PRESSURE: 60 MMHG | OXYGEN SATURATION: 98 % | TEMPERATURE: 98.3 F | SYSTOLIC BLOOD PRESSURE: 114 MMHG | RESPIRATION RATE: 14 BRPM

## 2023-01-12 DIAGNOSIS — R19.7 DIARRHEA, UNSPECIFIED TYPE: ICD-10-CM

## 2023-01-12 DIAGNOSIS — K58.0 IRRITABLE BOWEL SYNDROME WITH DIARRHEA: ICD-10-CM

## 2023-01-12 DIAGNOSIS — M79.7 FIBROMYALGIA: ICD-10-CM

## 2023-01-12 DIAGNOSIS — Z12.11 COLON CANCER SCREENING: ICD-10-CM

## 2023-01-12 DIAGNOSIS — M81.0 AGE-RELATED OSTEOPOROSIS WITHOUT CURRENT PATHOLOGICAL FRACTURE: Primary | ICD-10-CM

## 2023-01-12 DIAGNOSIS — Z23 NEED FOR VACCINATION: ICD-10-CM

## 2023-01-12 LAB
A/G RATIO: 1.8 (ref 1.1–2.2)
ALBUMIN SERPL-MCNC: 4.1 G/DL (ref 3.4–5)
ALP BLD-CCNC: 94 U/L (ref 40–129)
ALT SERPL-CCNC: 17 U/L (ref 10–40)
ANION GAP SERPL CALCULATED.3IONS-SCNC: 11 MMOL/L (ref 3–16)
AST SERPL-CCNC: 24 U/L (ref 15–37)
BASOPHILS ABSOLUTE: 0 K/UL (ref 0–0.2)
BASOPHILS RELATIVE PERCENT: 0.8 %
BILIRUB SERPL-MCNC: 0.3 MG/DL (ref 0–1)
BUN BLDV-MCNC: 14 MG/DL (ref 7–20)
CALCIUM SERPL-MCNC: 9.2 MG/DL (ref 8.3–10.6)
CHLORIDE BLD-SCNC: 107 MMOL/L (ref 99–110)
CO2: 24 MMOL/L (ref 21–32)
CREAT SERPL-MCNC: 0.7 MG/DL (ref 0.6–1.2)
EOSINOPHILS ABSOLUTE: 0.1 K/UL (ref 0–0.6)
EOSINOPHILS RELATIVE PERCENT: 2.3 %
GFR SERPL CREATININE-BSD FRML MDRD: >60 ML/MIN/{1.73_M2}
GLUCOSE BLD-MCNC: 101 MG/DL (ref 70–99)
HCT VFR BLD CALC: 39.6 % (ref 36–48)
HEMOGLOBIN: 13.1 G/DL (ref 12–16)
IGA: 100 MG/DL (ref 70–400)
LYMPHOCYTES ABSOLUTE: 1.3 K/UL (ref 1–5.1)
LYMPHOCYTES RELATIVE PERCENT: 30.2 %
MCH RBC QN AUTO: 31 PG (ref 26–34)
MCHC RBC AUTO-ENTMCNC: 33 G/DL (ref 31–36)
MCV RBC AUTO: 94 FL (ref 80–100)
MONOCYTES ABSOLUTE: 0.3 K/UL (ref 0–1.3)
MONOCYTES RELATIVE PERCENT: 7.2 %
NEUTROPHILS ABSOLUTE: 2.5 K/UL (ref 1.7–7.7)
NEUTROPHILS RELATIVE PERCENT: 59.5 %
PDW BLD-RTO: 13.8 % (ref 12.4–15.4)
PLATELET # BLD: 156 K/UL (ref 135–450)
PMV BLD AUTO: 10.3 FL (ref 5–10.5)
POTASSIUM SERPL-SCNC: 4.4 MMOL/L (ref 3.5–5.1)
RBC # BLD: 4.21 M/UL (ref 4–5.2)
SODIUM BLD-SCNC: 142 MMOL/L (ref 136–145)
TOTAL PROTEIN: 6.4 G/DL (ref 6.4–8.2)
WBC # BLD: 4.2 K/UL (ref 4–11)

## 2023-01-12 PROCEDURE — 99214 OFFICE O/P EST MOD 30 MIN: CPT | Performed by: FAMILY MEDICINE

## 2023-01-12 PROCEDURE — 1123F ACP DISCUSS/DSCN MKR DOCD: CPT | Performed by: FAMILY MEDICINE

## 2023-01-12 RX ORDER — HYOSCYAMINE SULFATE 0.12 MG/1
1 TABLET SUBLINGUAL 4 TIMES DAILY PRN
Qty: 30 EACH | Refills: 5 | Status: SHIPPED | OUTPATIENT
Start: 2023-01-12

## 2023-01-12 RX ORDER — ZOSTER VACCINE RECOMBINANT, ADJUVANTED 50 MCG/0.5
0.5 KIT INTRAMUSCULAR SEE ADMIN INSTRUCTIONS
Qty: 0.5 ML | Refills: 0 | Status: SHIPPED | OUTPATIENT
Start: 2023-01-12 | End: 2023-07-11

## 2023-01-12 ASSESSMENT — PATIENT HEALTH QUESTIONNAIRE - PHQ9
SUM OF ALL RESPONSES TO PHQ QUESTIONS 1-9: 0
SUM OF ALL RESPONSES TO PHQ QUESTIONS 1-9: 0
2. FEELING DOWN, DEPRESSED OR HOPELESS: 0
SUM OF ALL RESPONSES TO PHQ QUESTIONS 1-9: 0
SUM OF ALL RESPONSES TO PHQ9 QUESTIONS 1 & 2: 0
SUM OF ALL RESPONSES TO PHQ QUESTIONS 1-9: 0
1. LITTLE INTEREST OR PLEASURE IN DOING THINGS: 0

## 2023-01-13 LAB — TISSUE TRANSGLUTAMINASE IGA: <0.5 U/ML (ref 0–14)

## 2023-02-03 ENCOUNTER — TELEPHONE (OUTPATIENT)
Dept: ORTHOPEDIC SURGERY | Age: 78
End: 2023-02-03

## 2023-02-03 DIAGNOSIS — M17.11 PRIMARY OSTEOARTHRITIS OF RIGHT KNEE: ICD-10-CM

## 2023-02-03 DIAGNOSIS — M17.12 PRIMARY OSTEOARTHRITIS OF LEFT KNEE: Primary | ICD-10-CM

## 2023-02-03 NOTE — TELEPHONE ENCOUNTER
Order to start PA has been placed. We will f/u again with patient once approved. Sent [employer] message notifying her referral has been placed.

## 2023-02-03 NOTE — TELEPHONE ENCOUNTER
MONOVISC [id number]  BILATERAL KNEE   NO AUTHORIZATION REQUIRED. VALID & BILLABLE. CAN BUY& BILL. PER [employer] ONLINE FOR [employer]. LETTER UNDER MEDIA. [address] TO [facility] APPT FOR NELIDA MONOVISC. HAS BEEN APPROVED. MADDIE TO [facility] WITH CC WHEN RETURNS CALL.

## 2023-02-03 NOTE — TELEPHONE ENCOUNTER
Surgery and/or Procedure Scheduling     Contact Name: Kelly Bernal    Surgical/Procedure Request: PATIENT IS REQ INJECTIONS IN NELIDA KNEES. PLEASE ADVISE WITH THE START OF INSURANCE APPROVAL FOR THOSE.      Patient Contact Number: [phone]

## 2023-02-13 ENCOUNTER — OFFICE VISIT (OUTPATIENT)
Dept: ORTHOPEDIC SURGERY | Age: 78
End: 2023-02-13

## 2023-02-13 VITALS — HEIGHT: 63 IN | BODY MASS INDEX: 28.17 KG/M2 | WEIGHT: 159 LBS

## 2023-02-13 DIAGNOSIS — M17.11 PRIMARY OSTEOARTHRITIS OF RIGHT KNEE: ICD-10-CM

## 2023-02-13 DIAGNOSIS — M17.12 PRIMARY OSTEOARTHRITIS OF LEFT KNEE: Primary | ICD-10-CM

## 2023-02-16 NOTE — PROGRESS NOTES
2023     Reason for visit:  Bilateral knee pain    History of Present Illness: The patient's is a 67-year-old female who presents for evaluation of her bilateral knees. She reports on and off pain for several years with recent worsening. No recent traumatic injury. She localized the pain predominately to the anterior and deep aspect of the knee. Is made worse with standing, walking, stairs. Occasional swelling. No catching or locking. At the last visit cortisone injections were given which failed to provide relief. Objective:  Ht 5' 3\" (1.6 m)   Wt 159 lb (72.1 kg)   BMI 28.17 kg/m²      Physical Exam:  The patient is well-appearing and in no apparent distress  Examination of the right and left knee   There is a small effusion, no gross deformity or skin changes  Range of motion reveals 0 to 130  Neg lachman, negative posterior drawer, no pain or laxity with varus or valgus stress at 0 degrees and 30 degrees of flexion  + joint line tenderness  5 out of 5 strength throughout distal muscle groups  Sensation is intact to light touch throughout all distributions  There is no calf swelling or tenderness  Palpable DP pulse, brisk cap refill, 2+ symmetric reflexes     Imagin view x-rays of the left and right knees were obtained in the office today on 2022 and reviewed. There is no fracture or dislocation. Tricompartmental degenerative changes are present with decreased joint space and osteophyte formation predominate involving the patellofemoral joint. Assessment:  Bilateral knee degenerative joint disease    Plan:  I discussed with the patient the diagnosis and treatment options. We discussed operative and nonoperative management. At this point I do recommend nonoperative management. Nonoperative treatment options include activity modification, anti-inflammatory medications, physical therapy, and injections. The patient elected proceed with HA injection.   Therefore Monovisc injection was given to the right and left knees via sterile technique. The patient tolerated this well. The patient return as needed. In future we can repeat cortisone or alternatively consider hyaluronic acid injection. The patient may also be a candidate for total knee arthroplasty at some point in future    Greater than 20 minutes were spent with this encounter. Time spent included evaluating the patient's chart prior to arrival.  Evaluating the patient in the office including history, physical examination, imaging reviewing, and counseling on next steps. Lastly, time was spent discussing orders with my staff as well as providing documentation in the chart. Julio C Martins MD            Orthopaedic Surgery Sports Medicine and [address] [facility] [address] and [address] [facility] Physician [facility] ([address])      Disclaimer: This note was dictated with voice recognition software. Though review and correction are routine, we apologize for any errors.

## 2023-03-13 ENCOUNTER — TELEPHONE (OUTPATIENT)
Dept: FAMILY MEDICINE CLINIC | Age: 78
End: 2023-03-13

## 2023-03-13 NOTE — TELEPHONE ENCOUNTER
Patient is requesting this medication in case she needs it after Dr. Desi Blum, she wants to be prepared. sulfamethoxazole-trimethoprim (BACTRIM DS;SEPTRA DS) 800-160 MG per tablet [[id number]]  ENDED    Order Details  Dose: 1 tablet Route: Oral Frequency: 2 TIMES DAILY   Dispense Quantity: 10 tablet Refills: 0          Sig: Take 1 tablet by mouth in the morning and 1 tablet before bedtime. Do all this for 5 days.          [facility] [address] [place] [address] [phone]

## 2023-03-18 ENCOUNTER — TELEPHONE (OUTPATIENT)
Dept: FAMILY MEDICINE CLINIC | Age: 78
End: 2023-03-18

## 2023-03-18 RX ORDER — NITROFURANTOIN 25; 75 MG/1; MG/1
100 CAPSULE ORAL 2 TIMES DAILY
Qty: 14 CAPSULE | Refills: 0 | Status: SHIPPED | OUTPATIENT
Start: 2023-03-18 | End: 2023-03-25

## 2023-03-19 RX ORDER — SULFAMETHOXAZOLE AND TRIMETHOPRIM 800; 160 MG/1; MG/1
1 TABLET ORAL 2 TIMES DAILY
Qty: 14 TABLET | Refills: 0 | Status: SHIPPED | OUTPATIENT
Start: 2023-03-19 | End: 2023-03-26

## 2024-02-15 NOTE — CARE COORDINATION
CTN contacted Amparo Wick with [facility] [phone]. They have accepted this patient and will pull referral from [facility]. They will contact patient and make arrangements for [facility].    [facility] unable to accept at this time  Electronically signed by Bessy Brown LPN on 2/95/2804 at 02:91 AM Spoke with patient

## 2024-09-24 ENCOUNTER — TELEPHONE (OUTPATIENT)
Dept: SURGERY | Age: 79
End: 2024-09-24

## 2024-09-24 NOTE — TELEPHONE ENCOUNTER
Is on daily baby aspirin due to history of both heart attack and stroke. She just wants our office to know that.    Pt requesting one Ativan if possible because she will have difficulty if she is here longer than one hour. Would like sent to [facility] at corner of [address]: [phone] or [phone] (okay to leave  at either number)

## 2024-09-24 NOTE — TELEPHONE ENCOUNTER
Call returned & spoke to PT advising per Dr. Casarez: can not prescribe a controlled substance to a patient if I have never met her or established a clinical relationship with her.  She can either schedule a consult and reschedule her surgery or I can prescribe her something in the morning and whomever brings her can go pick it up for her and bring it back.    All options were given to PT as well as obtaining an RX for the Ativan from her PCP.  She liked this option and is going to try and obtain from the PCP today, she will call back to advise if she is able to get it or if we need to have her daughter-in-law go and  in the am, after they arrive.

## 2024-09-24 NOTE — TELEPHONE ENCOUNTER
Please see message below with update that patient is asking for script to be for 2 Ativan since she will likely be here so long.

## 2024-09-25 ENCOUNTER — PROCEDURE VISIT (OUTPATIENT)
Dept: SURGERY | Age: 79
End: 2024-09-25
Payer: MEDICARE

## 2024-09-25 VITALS — SYSTOLIC BLOOD PRESSURE: 120 MMHG | DIASTOLIC BLOOD PRESSURE: 68 MMHG | HEART RATE: 71 BPM

## 2024-09-25 DIAGNOSIS — C44.311 BASAL CELL CARCINOMA OF NOSE: Primary | ICD-10-CM

## 2024-09-25 PROCEDURE — 14060 TIS TRNFR E/N/E/L 10 SQ CM/<: CPT | Performed by: DERMATOLOGY

## 2024-09-25 PROCEDURE — 17311 MOHS 1 STAGE H/N/HF/G: CPT | Performed by: DERMATOLOGY

## 2024-09-25 RX ORDER — ASPIRIN 81 MG/1
81 TABLET ORAL DAILY
COMMUNITY

## 2024-09-26 ENCOUNTER — TELEPHONE (OUTPATIENT)
Dept: SURGERY | Age: 79
End: 2024-09-26

## 2024-10-02 ENCOUNTER — OFFICE VISIT (OUTPATIENT)
Dept: SURGERY | Age: 79
End: 2024-10-02

## 2024-10-02 DIAGNOSIS — Z48.02 VISIT FOR SUTURE REMOVAL: Primary | ICD-10-CM

## 2024-10-02 PROCEDURE — 99024 POSTOP FOLLOW-UP VISIT: CPT | Performed by: DERMATOLOGY

## 2024-10-02 NOTE — PROGRESS NOTES
S:  The patient is here for suture removal s/p Mohs surgery on the right nasal ala and V to Y Advancement Flap (non-tunneled island pedicle flap) repair, 1 week(s) ago. The site appears well-healed without signs of infection (redness, pain or discharge). The sutures were removed.  Flap looks great!  Wound care and activity instructions given.  The patient was scheduled for follow-up 2 months for scar/wound check.  The patient was scheduled for f/u with General Dermatology per their instructions.    Electronically signed by Ashlyn Vazquez RN on 10/2/2024 at 2:36 PM    IAshlyn RN, am scribing for and in the presence of Dr.Emily Casarez,10/2/2024.    Jennifer STAPLES MD,  personally performed the services described in this documentation as scribed by Ashlyn Vazquez RN  in my presence, and it is both accurate and complete.    Electronically signed by Jennifer Casarez MD on 10/2/2024 at 2:59 PM

## 2024-10-02 NOTE — PATIENT INSTRUCTIONS
[facility] Office Hours:    Monday-Thursday  7:30 AM-4:30 PM    Friday  9:00 AM-1:00 PM     WOUND CARE AFTER SUTURE REMOVAL    After your stiches have been removed, your scar is still very fragile. In fact, scars continue to change and evolve, what we call remodel, for about a year after your procedure.  Follow the following steps below to ensure that your scar heals well.    Instructions    1. If Steri-strips were applied, keep them on until they fall off on their own.  2. Protect your scar from the sun. Use a sunscreen or bandage to cover your scar. Sun exposure can cause your scar to become discolored and appear red or brown.  3. To help soften your scar more rapidly, it is helpful, but not necessary, for you to   massage the scar gently each night for twenty minutes.   4. “Spitting” suture. Occasionally, an inside suture (stitch) does not completely dissolve. When this happens, (generally 4-8 weeks after surgery), it causes a bump or “pimple” to form on the scar. This is easily removed and is not at all serious. It   does not mean the skin cancer has returned. Contact us if it happens, but do not be alarmed.      5. If you scar becomes tender, itchy or becomes very large, let Dr. Casarez know.  There    are treatments that can improve the appearance of your scar or help make it more comfortable.

## 2024-12-05 ENCOUNTER — OFFICE VISIT (OUTPATIENT)
Dept: SURGERY | Age: 79
End: 2024-12-05

## 2024-12-05 DIAGNOSIS — L90.5 SCAR: Primary | ICD-10-CM

## 2024-12-05 PROCEDURE — 99024 POSTOP FOLLOW-UP VISIT: CPT | Performed by: DERMATOLOGY

## 2024-12-05 NOTE — PROGRESS NOTES
S: The patient is here for scar check s/p Mohs on the right nasal ala with V to Y Advancement Flap (non-tunneled island pedicle flap) repair, 10 weeks ago.  The patient c/o discoloration at distal aspect of scar..  O: The scar is well-approximated and shows no signs of abnormal healing (expected amount of erythema, fullness and coloration), few clogged pores and spitting suture at distal tip of scar.   A/P: scar looks very good. Spitting sutures and clogged pores addressed. Expect continued improvement.  Patient questions answered and expectations reviewed.  The patient is scheduled for f/u scar check as needed and skin examination with General Dermatology per their recommendations.     IEduardo RN, am scribing for and in the presence of Dr. Jennifer Casarez.     IJennifer MD,  personally performed the services described in this documentation as scribed by Eduardo Ray RN  in my presence, and it is both accurate and complete.     Electronically signed by Jennifer Casarez MD on 12/5/2024 at 3:39 PM

## 2024-12-05 NOTE — PATIENT INSTRUCTIONS
[facility] Office Hours:    Monday-Thursday  7:30 AM-4:30 PM    Friday  9:00 AM-1:00 PM      Wound Care Massaging Instruction    Starting at approximately about 4 weeks following surgery, we often ask that our patients begin massaging their wound on a regular basis.  We suggest just a few minutes once a day. It is helpful to use an emollient such as Aquaphor, Vaseline or Eucerin cream.  The sutures that were placed underneath the surface of the skin take about 70 days to dissolve, and during that time, they can cause lumps along the line of the scar.  These lumps will eventually go away on their own, but the use of regular massage will help speed the process as well as help soften the scar tissue more quickly.  Please continue to use sunscreen, SPF 45 or higher during this time.